# Patient Record
Sex: FEMALE | Race: WHITE | Employment: FULL TIME | URBAN - METROPOLITAN AREA
[De-identification: names, ages, dates, MRNs, and addresses within clinical notes are randomized per-mention and may not be internally consistent; named-entity substitution may affect disease eponyms.]

---

## 2018-06-28 ENCOUNTER — HOSPITAL ENCOUNTER (OUTPATIENT)
Dept: PREADMISSION TESTING | Age: 58
Discharge: HOME | End: 2018-06-28
Payer: COMMERCIAL

## 2018-06-28 LAB
ANION GAP SERPL CALC-SCNC: 7 MMOL/L (ref 3–18)
ATRIAL RATE: 71 BPM
BUN SERPL-MCNC: 13 MG/DL (ref 7–18)
BUN/CREAT SERPL: 17 (ref 12–20)
CALCIUM SERPL-MCNC: 9.2 MG/DL (ref 8.5–10.1)
CALCULATED P AXIS, ECG09: 71 DEGREES
CALCULATED R AXIS, ECG10: 64 DEGREES
CALCULATED T AXIS, ECG11: 55 DEGREES
CHLORIDE SERPL-SCNC: 106 MMOL/L (ref 100–108)
CO2 SERPL-SCNC: 29 MMOL/L (ref 21–32)
CREAT SERPL-MCNC: 0.77 MG/DL (ref 0.6–1.3)
DIAGNOSIS, 93000: NORMAL
EST. AVERAGE GLUCOSE BLD GHB EST-MCNC: 126 MG/DL
GLUCOSE SERPL-MCNC: 112 MG/DL (ref 74–99)
HBA1C MFR BLD: 6 % (ref 4.5–5.6)
HCT VFR BLD AUTO: 43.8 % (ref 35–45)
HGB BLD-MCNC: 14.2 G/DL (ref 12–16)
P-R INTERVAL, ECG05: 158 MS
POTASSIUM SERPL-SCNC: 4.4 MMOL/L (ref 3.5–5.5)
Q-T INTERVAL, ECG07: 412 MS
QRS DURATION, ECG06: 70 MS
QTC CALCULATION (BEZET), ECG08: 447 MS
SODIUM SERPL-SCNC: 142 MMOL/L (ref 136–145)
VENTRICULAR RATE, ECG03: 71 BPM

## 2018-06-28 PROCEDURE — 80048 BASIC METABOLIC PNL TOTAL CA: CPT | Performed by: OBSTETRICS & GYNECOLOGY

## 2018-06-28 PROCEDURE — 85018 HEMOGLOBIN: CPT | Performed by: OBSTETRICS & GYNECOLOGY

## 2018-06-28 PROCEDURE — 36415 COLL VENOUS BLD VENIPUNCTURE: CPT | Performed by: OBSTETRICS & GYNECOLOGY

## 2018-06-28 PROCEDURE — 93005 ELECTROCARDIOGRAM TRACING: CPT

## 2018-06-28 PROCEDURE — 83036 HEMOGLOBIN GLYCOSYLATED A1C: CPT | Performed by: OBSTETRICS & GYNECOLOGY

## 2018-07-13 ENCOUNTER — ANESTHESIA (OUTPATIENT)
Dept: SURGERY | Age: 58
DRG: 747 | End: 2018-07-13
Payer: COMMERCIAL

## 2018-07-13 ENCOUNTER — HOSPITAL ENCOUNTER (INPATIENT)
Age: 58
LOS: 1 days | Discharge: HOME | DRG: 747 | End: 2018-07-14
Attending: OBSTETRICS & GYNECOLOGY | Admitting: OBSTETRICS & GYNECOLOGY
Payer: COMMERCIAL

## 2018-07-13 ENCOUNTER — ANESTHESIA EVENT (OUTPATIENT)
Dept: SURGERY | Age: 58
DRG: 747 | End: 2018-07-13
Payer: COMMERCIAL

## 2018-07-13 LAB
GLUCOSE BLD STRIP.AUTO-MCNC: 102 MG/DL (ref 70–110)
GLUCOSE BLD STRIP.AUTO-MCNC: 109 MG/DL (ref 70–110)
GLUCOSE BLD STRIP.AUTO-MCNC: 118 MG/DL (ref 70–110)
GLUCOSE BLD STRIP.AUTO-MCNC: 139 MG/DL (ref 70–110)

## 2018-07-13 PROCEDURE — 77030020782 HC GWN BAIR PAWS FLX 3M -B: Performed by: OBSTETRICS & GYNECOLOGY

## 2018-07-13 PROCEDURE — 77030018836 HC SOL IRR NACL ICUM -A: Performed by: OBSTETRICS & GYNECOLOGY

## 2018-07-13 PROCEDURE — 74011250637 HC RX REV CODE- 250/637: Performed by: OBSTETRICS & GYNECOLOGY

## 2018-07-13 PROCEDURE — 77030020269 HC MISC IMPL: Performed by: OBSTETRICS & GYNECOLOGY

## 2018-07-13 PROCEDURE — 77030031140 HC SUT VCRL3 J&J -A: Performed by: OBSTETRICS & GYNECOLOGY

## 2018-07-13 PROCEDURE — 76010000131 HC OR TIME 2 TO 2.5 HR: Performed by: OBSTETRICS & GYNECOLOGY

## 2018-07-13 PROCEDURE — 77030018832 HC SOL IRR H20 ICUM -A: Performed by: OBSTETRICS & GYNECOLOGY

## 2018-07-13 PROCEDURE — 77030012508 HC MSK AIRWY LMA AMBU -A: Performed by: ANESTHESIOLOGY

## 2018-07-13 PROCEDURE — 74011000250 HC RX REV CODE- 250

## 2018-07-13 PROCEDURE — 82962 GLUCOSE BLOOD TEST: CPT

## 2018-07-13 PROCEDURE — 88307 TISSUE EXAM BY PATHOLOGIST: CPT | Performed by: OBSTETRICS & GYNECOLOGY

## 2018-07-13 PROCEDURE — 76210000006 HC OR PH I REC 0.5 TO 1 HR: Performed by: OBSTETRICS & GYNECOLOGY

## 2018-07-13 PROCEDURE — 77030032490 HC SLV COMPR SCD KNE COVD -B: Performed by: OBSTETRICS & GYNECOLOGY

## 2018-07-13 PROCEDURE — 65270000029 HC RM PRIVATE

## 2018-07-13 PROCEDURE — 0TJB8ZZ INSPECTION OF BLADDER, VIA NATURAL OR ARTIFICIAL OPENING ENDOSCOPIC: ICD-10-PCS | Performed by: OBSTETRICS & GYNECOLOGY

## 2018-07-13 PROCEDURE — 74011250636 HC RX REV CODE- 250/636

## 2018-07-13 PROCEDURE — 0JQC0ZZ REPAIR PELVIC REGION SUBCUTANEOUS TISSUE AND FASCIA, OPEN APPROACH: ICD-10-PCS | Performed by: OBSTETRICS & GYNECOLOGY

## 2018-07-13 PROCEDURE — 0USG0ZZ REPOSITION VAGINA, OPEN APPROACH: ICD-10-PCS | Performed by: OBSTETRICS & GYNECOLOGY

## 2018-07-13 PROCEDURE — 74011250636 HC RX REV CODE- 250/636: Performed by: ANESTHESIOLOGY

## 2018-07-13 PROCEDURE — 77030003029 HC SUT VCRL J&J -B: Performed by: OBSTETRICS & GYNECOLOGY

## 2018-07-13 PROCEDURE — 88305 TISSUE EXAM BY PATHOLOGIST: CPT | Performed by: OBSTETRICS & GYNECOLOGY

## 2018-07-13 PROCEDURE — 77030008064 HC RNG RETRCTR COOP -B: Performed by: OBSTETRICS & GYNECOLOGY

## 2018-07-13 PROCEDURE — 77030010011 HC RNG RETRCTR STAY COOP -B: Performed by: OBSTETRICS & GYNECOLOGY

## 2018-07-13 PROCEDURE — 77030019927 HC TBNG IRR CYSTO BAXT -A: Performed by: OBSTETRICS & GYNECOLOGY

## 2018-07-13 PROCEDURE — 77030005521 HC CATH URETH FOL38 BARD -B: Performed by: OBSTETRICS & GYNECOLOGY

## 2018-07-13 PROCEDURE — 74011000250 HC RX REV CODE- 250: Performed by: OBSTETRICS & GYNECOLOGY

## 2018-07-13 PROCEDURE — 74011250636 HC RX REV CODE- 250/636: Performed by: OBSTETRICS & GYNECOLOGY

## 2018-07-13 PROCEDURE — 77030026102 HC DEV TISS ENSEAL G2 J&J -F: Performed by: OBSTETRICS & GYNECOLOGY

## 2018-07-13 PROCEDURE — 77030031139 HC SUT VCRL2 J&J -A: Performed by: OBSTETRICS & GYNECOLOGY

## 2018-07-13 PROCEDURE — 76060000035 HC ANESTHESIA 2 TO 2.5 HR: Performed by: OBSTETRICS & GYNECOLOGY

## 2018-07-13 PROCEDURE — 0UTC0ZZ RESECTION OF CERVIX, OPEN APPROACH: ICD-10-PCS | Performed by: OBSTETRICS & GYNECOLOGY

## 2018-07-13 PROCEDURE — 77030011640 HC PAD GRND REM COVD -A: Performed by: OBSTETRICS & GYNECOLOGY

## 2018-07-13 RX ORDER — SODIUM CHLORIDE 0.9 % (FLUSH) 0.9 %
5-10 SYRINGE (ML) INJECTION AS NEEDED
Status: DISCONTINUED | OUTPATIENT
Start: 2018-07-13 | End: 2018-07-14 | Stop reason: HOSPADM

## 2018-07-13 RX ORDER — KETAMINE HYDROCHLORIDE 10 MG/ML
INJECTION, SOLUTION INTRAMUSCULAR; INTRAVENOUS AS NEEDED
Status: DISCONTINUED | OUTPATIENT
Start: 2018-07-13 | End: 2018-07-13 | Stop reason: HOSPADM

## 2018-07-13 RX ORDER — OXYCODONE AND ACETAMINOPHEN 5; 325 MG/1; MG/1
1 TABLET ORAL
Status: DISCONTINUED | OUTPATIENT
Start: 2018-07-13 | End: 2018-07-14 | Stop reason: HOSPADM

## 2018-07-13 RX ORDER — INSULIN LISPRO 100 [IU]/ML
INJECTION, SOLUTION INTRAVENOUS; SUBCUTANEOUS ONCE
Status: DISCONTINUED | OUTPATIENT
Start: 2018-07-13 | End: 2018-07-13 | Stop reason: HOSPADM

## 2018-07-13 RX ORDER — ONDANSETRON 2 MG/ML
4 INJECTION INTRAMUSCULAR; INTRAVENOUS
Status: DISCONTINUED | OUTPATIENT
Start: 2018-07-13 | End: 2018-07-14 | Stop reason: HOSPADM

## 2018-07-13 RX ORDER — ALBUTEROL SULFATE 0.83 MG/ML
2.5 SOLUTION RESPIRATORY (INHALATION) AS NEEDED
Status: DISCONTINUED | OUTPATIENT
Start: 2018-07-13 | End: 2018-07-13 | Stop reason: HOSPADM

## 2018-07-13 RX ORDER — BUPIVACAINE HYDROCHLORIDE AND EPINEPHRINE 2.5; 5 MG/ML; UG/ML
INJECTION, SOLUTION EPIDURAL; INFILTRATION; INTRACAUDAL; PERINEURAL AS NEEDED
Status: DISCONTINUED | OUTPATIENT
Start: 2018-07-13 | End: 2018-07-13 | Stop reason: HOSPADM

## 2018-07-13 RX ORDER — DEXAMETHASONE SODIUM PHOSPHATE 4 MG/ML
INJECTION, SOLUTION INTRA-ARTICULAR; INTRALESIONAL; INTRAMUSCULAR; INTRAVENOUS; SOFT TISSUE AS NEEDED
Status: DISCONTINUED | OUTPATIENT
Start: 2018-07-13 | End: 2018-07-13 | Stop reason: HOSPADM

## 2018-07-13 RX ORDER — DIPHENHYDRAMINE HYDROCHLORIDE 50 MG/ML
12.5 INJECTION, SOLUTION INTRAMUSCULAR; INTRAVENOUS
Status: DISCONTINUED | OUTPATIENT
Start: 2018-07-13 | End: 2018-07-13 | Stop reason: HOSPADM

## 2018-07-13 RX ORDER — MAGNESIUM SULFATE 100 %
4 CRYSTALS MISCELLANEOUS AS NEEDED
Status: DISCONTINUED | OUTPATIENT
Start: 2018-07-13 | End: 2018-07-13 | Stop reason: HOSPADM

## 2018-07-13 RX ORDER — SODIUM CHLORIDE 0.9 % (FLUSH) 0.9 %
5-10 SYRINGE (ML) INJECTION AS NEEDED
Status: DISCONTINUED | OUTPATIENT
Start: 2018-07-13 | End: 2018-07-13 | Stop reason: HOSPADM

## 2018-07-13 RX ORDER — FENTANYL CITRATE 50 UG/ML
25 INJECTION, SOLUTION INTRAMUSCULAR; INTRAVENOUS AS NEEDED
Status: DISCONTINUED | OUTPATIENT
Start: 2018-07-13 | End: 2018-07-13 | Stop reason: HOSPADM

## 2018-07-13 RX ORDER — SODIUM CHLORIDE, SODIUM LACTATE, POTASSIUM CHLORIDE, CALCIUM CHLORIDE 600; 310; 30; 20 MG/100ML; MG/100ML; MG/100ML; MG/100ML
125 INJECTION, SOLUTION INTRAVENOUS CONTINUOUS
Status: DISCONTINUED | OUTPATIENT
Start: 2018-07-13 | End: 2018-07-14 | Stop reason: HOSPADM

## 2018-07-13 RX ORDER — ACETAMINOPHEN 10 MG/ML
INJECTION, SOLUTION INTRAVENOUS AS NEEDED
Status: DISCONTINUED | OUTPATIENT
Start: 2018-07-13 | End: 2018-07-13 | Stop reason: HOSPADM

## 2018-07-13 RX ORDER — EPHEDRINE SULFATE/0.9% NACL/PF 25 MG/5 ML
SYRINGE (ML) INTRAVENOUS AS NEEDED
Status: DISCONTINUED | OUTPATIENT
Start: 2018-07-13 | End: 2018-07-13 | Stop reason: HOSPADM

## 2018-07-13 RX ORDER — OXYCODONE AND ACETAMINOPHEN 5; 325 MG/1; MG/1
2 TABLET ORAL
Status: DISCONTINUED | OUTPATIENT
Start: 2018-07-13 | End: 2018-07-14 | Stop reason: HOSPADM

## 2018-07-13 RX ORDER — CEFAZOLIN SODIUM/WATER 2 G/20 ML
2 SYRINGE (ML) INTRAVENOUS
Status: COMPLETED | OUTPATIENT
Start: 2018-07-13 | End: 2018-07-13

## 2018-07-13 RX ORDER — DEXTROSE 50 % IN WATER (D50W) INTRAVENOUS SYRINGE
25-50 AS NEEDED
Status: DISCONTINUED | OUTPATIENT
Start: 2018-07-13 | End: 2018-07-13 | Stop reason: HOSPADM

## 2018-07-13 RX ORDER — DIPHENHYDRAMINE HCL 25 MG
25 CAPSULE ORAL
Status: DISCONTINUED | OUTPATIENT
Start: 2018-07-13 | End: 2018-07-14 | Stop reason: HOSPADM

## 2018-07-13 RX ORDER — HYDROMORPHONE HYDROCHLORIDE 2 MG/ML
INJECTION, SOLUTION INTRAMUSCULAR; INTRAVENOUS; SUBCUTANEOUS AS NEEDED
Status: DISCONTINUED | OUTPATIENT
Start: 2018-07-13 | End: 2018-07-13 | Stop reason: HOSPADM

## 2018-07-13 RX ORDER — NALOXONE HYDROCHLORIDE 0.4 MG/ML
0.1 INJECTION, SOLUTION INTRAMUSCULAR; INTRAVENOUS; SUBCUTANEOUS AS NEEDED
Status: DISCONTINUED | OUTPATIENT
Start: 2018-07-13 | End: 2018-07-13 | Stop reason: HOSPADM

## 2018-07-13 RX ORDER — ONDANSETRON 2 MG/ML
4 INJECTION INTRAMUSCULAR; INTRAVENOUS ONCE
Status: DISCONTINUED | OUTPATIENT
Start: 2018-07-13 | End: 2018-07-13 | Stop reason: HOSPADM

## 2018-07-13 RX ORDER — ONDANSETRON 2 MG/ML
INJECTION INTRAMUSCULAR; INTRAVENOUS AS NEEDED
Status: DISCONTINUED | OUTPATIENT
Start: 2018-07-13 | End: 2018-07-13 | Stop reason: HOSPADM

## 2018-07-13 RX ORDER — SODIUM CHLORIDE, SODIUM LACTATE, POTASSIUM CHLORIDE, CALCIUM CHLORIDE 600; 310; 30; 20 MG/100ML; MG/100ML; MG/100ML; MG/100ML
150 INJECTION, SOLUTION INTRAVENOUS CONTINUOUS
Status: DISCONTINUED | OUTPATIENT
Start: 2018-07-13 | End: 2018-07-13 | Stop reason: HOSPADM

## 2018-07-13 RX ORDER — KETOROLAC TROMETHAMINE 30 MG/ML
30 INJECTION, SOLUTION INTRAMUSCULAR; INTRAVENOUS
Status: DISCONTINUED | OUTPATIENT
Start: 2018-07-13 | End: 2018-07-14 | Stop reason: HOSPADM

## 2018-07-13 RX ORDER — SODIUM CHLORIDE 0.9 % (FLUSH) 0.9 %
5-10 SYRINGE (ML) INJECTION EVERY 8 HOURS
Status: DISCONTINUED | OUTPATIENT
Start: 2018-07-13 | End: 2018-07-14 | Stop reason: HOSPADM

## 2018-07-13 RX ORDER — FENTANYL CITRATE 50 UG/ML
INJECTION, SOLUTION INTRAMUSCULAR; INTRAVENOUS AS NEEDED
Status: DISCONTINUED | OUTPATIENT
Start: 2018-07-13 | End: 2018-07-13 | Stop reason: HOSPADM

## 2018-07-13 RX ORDER — MIDAZOLAM HYDROCHLORIDE 1 MG/ML
INJECTION, SOLUTION INTRAMUSCULAR; INTRAVENOUS AS NEEDED
Status: DISCONTINUED | OUTPATIENT
Start: 2018-07-13 | End: 2018-07-13 | Stop reason: HOSPADM

## 2018-07-13 RX ADMIN — KETOROLAC TROMETHAMINE 30 MG: 30 INJECTION, SOLUTION INTRAMUSCULAR at 19:57

## 2018-07-13 RX ADMIN — Medication 2 G: at 11:55

## 2018-07-13 RX ADMIN — SODIUM CHLORIDE, SODIUM LACTATE, POTASSIUM CHLORIDE, AND CALCIUM CHLORIDE 125 ML/HR: 600; 310; 30; 20 INJECTION, SOLUTION INTRAVENOUS at 19:52

## 2018-07-13 RX ADMIN — HYDROMORPHONE HYDROCHLORIDE 0.5 MG: 2 INJECTION, SOLUTION INTRAMUSCULAR; INTRAVENOUS; SUBCUTANEOUS at 13:35

## 2018-07-13 RX ADMIN — SODIUM CHLORIDE, SODIUM LACTATE, POTASSIUM CHLORIDE, AND CALCIUM CHLORIDE 150 ML/HR: 600; 310; 30; 20 INJECTION, SOLUTION INTRAVENOUS at 15:25

## 2018-07-13 RX ADMIN — FENTANYL CITRATE 50 MCG: 50 INJECTION, SOLUTION INTRAMUSCULAR; INTRAVENOUS at 11:52

## 2018-07-13 RX ADMIN — DEXAMETHASONE SODIUM PHOSPHATE 4 MG: 4 INJECTION, SOLUTION INTRA-ARTICULAR; INTRALESIONAL; INTRAMUSCULAR; INTRAVENOUS; SOFT TISSUE at 11:52

## 2018-07-13 RX ADMIN — ONDANSETRON 4 MG: 2 INJECTION INTRAMUSCULAR; INTRAVENOUS at 13:36

## 2018-07-13 RX ADMIN — SODIUM CHLORIDE, SODIUM LACTATE, POTASSIUM CHLORIDE, AND CALCIUM CHLORIDE: 600; 310; 30; 20 INJECTION, SOLUTION INTRAVENOUS at 11:48

## 2018-07-13 RX ADMIN — MIDAZOLAM HYDROCHLORIDE 2 MG: 1 INJECTION, SOLUTION INTRAMUSCULAR; INTRAVENOUS at 11:47

## 2018-07-13 RX ADMIN — KETAMINE HYDROCHLORIDE 50 MG: 10 INJECTION, SOLUTION INTRAMUSCULAR; INTRAVENOUS at 11:57

## 2018-07-13 RX ADMIN — Medication 5 MG: at 12:04

## 2018-07-13 RX ADMIN — SODIUM CHLORIDE, SODIUM LACTATE, POTASSIUM CHLORIDE, AND CALCIUM CHLORIDE: 600; 310; 30; 20 INJECTION, SOLUTION INTRAVENOUS at 13:30

## 2018-07-13 RX ADMIN — FENTANYL CITRATE 25 MCG: 50 INJECTION, SOLUTION INTRAMUSCULAR; INTRAVENOUS at 12:25

## 2018-07-13 RX ADMIN — Medication 5 MG: at 12:10

## 2018-07-13 RX ADMIN — SODIUM CHLORIDE, SODIUM LACTATE, POTASSIUM CHLORIDE, AND CALCIUM CHLORIDE 125 ML/HR: 600; 310; 30; 20 INJECTION, SOLUTION INTRAVENOUS at 10:17

## 2018-07-13 RX ADMIN — FENTANYL CITRATE 25 MCG: 50 INJECTION, SOLUTION INTRAMUSCULAR; INTRAVENOUS at 12:36

## 2018-07-13 RX ADMIN — HYDROMORPHONE HYDROCHLORIDE 0.5 MG: 2 INJECTION, SOLUTION INTRAMUSCULAR; INTRAVENOUS; SUBCUTANEOUS at 13:53

## 2018-07-13 RX ADMIN — ACETAMINOPHEN 1000 MG: 10 INJECTION, SOLUTION INTRAVENOUS at 12:17

## 2018-07-13 RX ADMIN — OXYCODONE HYDROCHLORIDE AND ACETAMINOPHEN 2 TABLET: 5; 325 TABLET ORAL at 19:57

## 2018-07-13 NOTE — PERIOP NOTES
Patient received from OR Team. Dual identification and skin assessment completed. Review of Procedure and Intra Operative Course conducted.

## 2018-07-13 NOTE — BRIEF OP NOTE
BRIEF OPERATIVE NOTE    Date of Procedure: 7/13/2018     Preoperative Diagnosis: CERVICAL STUMP PROLAPSE,VAGINAL ENTEROCELE,CYSTOCELE    Postoperative Diagnosis: CERVICAL STUMP PROLAPSE,VAGINAL ENTEROCELE,CYSTOCELE, pelvic mass      Procedure(s):  TRACHELECTOMY,ANTERIOR AND POSTERIOR REPAIR,SACROSPINOUS LIGAMENT SUSPENSION,CYSTOSCOPY **SPEC POP**, Examination under anesthesia    Surgeon(s) and Role:     * Benjamin Barbosa MD - Primary         Surgical Assistant: Hazel Field CST    Surgical Staff:  Circ-1: Fady Dumont  Circ-Relief: Gio Dougherty RN; Camilo Ruiz, WOLF  Surg Asst-1: Delisa Reynolds    Event Time In   Incision Start 1211   Incision Close 1347     Anesthesia: General     Estimated Blood Loss: 100 ml    Specimens:   ID Type Source Tests Collected by Time Destination   1 : CERVIX Preservative Cervix  Benjamin Barbosa MD 7/13/2018 1258 Pathology      Findings: Examination under anesthesia revealed a well-circumscribed, smooth pelvic mass, 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 Pt had known pelvic relaxation with prolapse of the cervix to the introitus, anterior defect and posterior compartment defect. The uterus was surgically absent (supracervical hysterectomy)     Complications: none    Implants:   Implant Name Type Inv.  Item Serial No.  Lot No. LRB No. Used Action   [facility]       [employer] [id number] N/A 1 Implanted     Disposition:  Stable to recovery    Benjamin Barbosa MD 2:15 PM 7/13/2018

## 2018-07-13 NOTE — IP AVS SNAPSHOT
[address] [address] 
[phone] Patient: Seng Davis MRN: [id number] UTH:5[birth year] A check huong indicates which time of day the medication should be taken. My Medications START taking these medications Instructions Each Dose to Equal  
 Morning Noon Evening Bedtime  
 oxyCODONE-acetaminophen 5-325 mg per tablet Commonly known as:  PERCOCET Your last dose was: Your next dose is: Take 1 Tab by mouth every four (4) hours as needed. Max Daily Amount: 6 Tabs. 1 Tab CONTINUE taking these medications Instructions Each Dose to Equal  
 Morning Noon Evening Bedtime  
 atorvastatin 20 mg tablet Commonly known as:  LIPITOR Your last dose was: Your next dose is: TAKE 1 TABLET BY MOUTH bedtime  
     
   
   
   
  
 clobetasol 0.05 % ointment Commonly known as:  Linzie Rubins Your last dose was: Your next dose is:    
   
   
 Apply  to affected area. diclofenac EC 75 mg EC tablet Commonly known as:  VOLTAREN Your last dose was: Your next dose is: Take  by mouth two (2) times daily as needed. ENBREL 50 mg/mL (0.98 mL) injection Generic drug:  etanercept Your last dose was: Your next dose is:    
   
   
 by SubCUTAneous route Every Thursday. hydrOXYzine HCl 10 mg tablet Commonly known as:  ATARAX Your last dose was: Your next dose is: Take 10 mg by mouth. Indications: psoriasis 10 mg  
    
   
   
   
  
 metFORMIN 500 mg tablet Commonly known as:  GLUCOPHAGE Your last dose was: Your next dose is: Take 500 mg by mouth nightly. Indications: type 2 diabetes mellitus 500 mg PROBIOTIC 4X PO Your last dose was: Your next dose is: Take 1 Tab by mouth. 1 Tab  
    
   
   
   
  
 traZODone 150 mg tablet Commonly known as:  Padilla Gordon Your last dose was: Your next dose is:    
   
   
 1 po qhs  
     
   
   
   
  
 urea 40 % topical cream  
Commonly known as:  CARMOL Your last dose was: Your next dose is:    
   
   
 NAY MARBLE SIZED AMOUNT TO THICKENED AREAS ON BODY ONCE TO BID PRN  
     
   
   
   
  
 XANAX 2 mg tablet Generic drug:  ALPRAZolam  
   
Your last dose was: Your next dose is:    
   
   
 1 tablet by mouth every 8 hours as needed for severe anxiety Where to Get Your Medications Information on where to get these meds will be given to you by the nurse or doctor. ! Ask your nurse or doctor about these medications  
  oxyCODONE-acetaminophen 5-325 mg per tablet

## 2018-07-13 NOTE — ANESTHESIA POSTPROCEDURE EVALUATION
Post-Anesthesia Evaluation and Assessment    Cardiovascular Function/Vital Signs  Visit Vitals    /68 (BP 1 Location: Right arm, BP Patient Position: At rest)    Pulse 65    Temp 36.6 °C (97.8 °F)    Resp 14    Ht 5' 7.5\" (1.715 m)    Wt 79.9 kg (176 lb 1 oz)    SpO2 100%    BMI 27.17 kg/m2       Patient is status post Procedure(s):  TRACHELECTOMY,ANTERIOR AND POSTERIOR REPAIR,SACROSPINOUS LIGAMENT SUSPENSION,CYSTOSCOPY **SPEC POP**. Nausea/Vomiting: Controlled. Postoperative hydration reviewed and adequate. Pain:  Pain Scale 1: Numeric (0 - 10) (07/13/18 1450)  Pain Intensity 1: 0 (07/13/18 1450)   Managed. Neurological Status:   Neuro (WDL): Exceptions to WDL (07/13/18 1359)   At baseline. Mental Status and Level of Consciousness: Arousable. Pulmonary Status:   O2 Device: Nasal cannula (07/13/18 1450)   Adequate oxygenation and airway patent. Complications related to anesthesia: None    Post-anesthesia assessment completed. No concerns. Patient has met all discharge requirements.     Signed By: Antoni Massey CRNA    July 13, 2018

## 2018-07-13 NOTE — IP AVS SNAPSHOT
[address] [place] [id number] 
[phone] Patient: Rosario Anguiano MRN: [id number] VELMA:[id number] About your hospitalization You were admitted on:  July 13, 2018 You last received care in the:  [address] [place] [address] You were discharged on:  July 14, 2018 Why you were hospitalized Your primary diagnosis was:  Not on File Your diagnoses also included:  Pelvic Relaxation Due To Cervical Stump Prolpase Follow-up Information Follow up With Details Comments Contact Info Abraham Stephenson, MD   Patient can only remember the practice name and not the physician Discharge Orders None A check huong indicates which time of day the medication should be taken. My Medications START taking these medications Instructions Each Dose to Equal  
 Morning Noon Evening Bedtime  
 oxyCODONE-acetaminophen 5-325 mg per tablet Commonly known as:  PERCOCET Your last dose was: Your next dose is: Take 1 Tab by mouth every four (4) hours as needed. Max Daily Amount: 6 Tabs. 1 Tab CONTINUE taking these medications Instructions Each Dose to Equal  
 Morning Noon Evening Bedtime  
 atorvastatin 20 mg tablet Commonly known as:  LIPITOR Your last dose was: Your next dose is: TAKE 1 TABLET BY MOUTH bedtime  
     
   
   
   
  
 clobetasol 0.05 % ointment Commonly known as:  Harrie Reagin Your last dose was: Your next dose is:    
   
   
 Apply  to affected area. diclofenac EC 75 mg EC tablet Commonly known as:  VOLTAREN Your last dose was: Your next dose is: Take  by mouth two (2) times daily as needed. ENBREL 50 mg/mL (0.98 mL) injection Generic drug:  etanercept Your last dose was: Your next dose is: by SubCUTAneous route Every Thursday. hydrOXYzine HCl 10 mg tablet Commonly known as:  ATARAX Your last dose was: Your next dose is: Take 10 mg by mouth. Indications: psoriasis 10 mg  
    
   
   
   
  
 metFORMIN 500 mg tablet Commonly known as:  GLUCOPHAGE Your last dose was: Your next dose is: Take 500 mg by mouth nightly. Indications: type 2 diabetes mellitus 500 mg PROBIOTIC 4X PO Your last dose was: Your next dose is: Take 1 Tab by mouth. 1 Tab  
    
   
   
   
  
 traZODone 150 mg tablet Commonly known as:  Debbe Gunner Your last dose was: Your next dose is:    
   
   
 1 po qhs  
     
   
   
   
  
 urea 40 % topical cream  
Commonly known as:  CARMOL Your last dose was: Your next dose is:    
   
   
 NAY MARBLE SIZED AMOUNT TO THICKENED AREAS ON BODY ONCE TO BID PRN  
     
   
   
   
  
 XANAX 2 mg tablet Generic drug:  ALPRAZolam  
   
Your last dose was: Your next dose is:    
   
   
 1 tablet by mouth every 8 hours as needed for severe anxiety Where to Get Your Medications Information on where to get these meds will be given to you by the nurse or doctor. ! Ask your nurse or doctor about these medications  
  oxyCODONE-acetaminophen 5-325 mg per tablet Opioid Education Prescription Opioids: What You Need to Know: 
 
Prescription opioids can be used to help relieve moderate-to-severe pain and are often prescribed following a surgery or injury, or for certain health conditions. These medications can be an important part of treatment but also come with serious risks. Opioids are strong pain medicines. Examples include hydrocodone, oxycodone, fentanyl, and morphine. Heroin is an example of an illegal opioid.   It is important to work with your health care provider to make sure you are getting the safest, most effective care. WHAT ARE THE RISKS AND SIDE EFFECTS OF OPIOID USE? Prescription opioids carry serious risks of addiction and overdose, especially with prolonged use. An opioid overdose, often marked by slow breathing, can cause sudden death. The use of prescription opioids can have a number of side effects as well, even when taken as directed. · Tolerance-meaning you might need to take more of a medication for the same pain relief · Physical dependence-meaning you have symptoms of withdrawal when the medication is stopped. Withdrawal symptoms can include nausea, sweating, chills, diarrhea, stomach cramps, and muscle aches. Withdrawal can last up to several weeks, depending on which drug you took and how long you took it. · Increased sensitivity to pain · Constipation · Nausea, vomiting, and dry mouth · Sleepiness and dizziness · Confusion · Depression · Low levels of testosterone that can result in lower sex drive, energy, and strength · Itching and sweating RISKS ARE GREATER WITH:      
· History of drug misuse, substance use disorder, or overdose · Mental health conditions (such as depression or anxiety) · Sleep apnea · Older age (72 years or older) · Pregnancy Avoid alcohol while taking prescription opioids. Also, unless specifically advised by your health care provider, medications to avoid include: · Benzodiazepines (such as Xanax or Valium) · Muscle relaxants (such as Soma or Flexeril) · Hypnotics (such as Ambien or Lunesta) · Other prescription opioids KNOW YOUR OPTIONS Talk to your health care provider about ways to manage your pain that don't involve prescription opioids. Some of these options may actually work better and have fewer risks and side effects. Options may include: 
· Pain relievers such as acetaminophen, ibuprofen, and naproxen · Some medications that are also used for depression or seizures · Physical therapy and exercise · Counseling to help patients learn how to cope better with triggers of pain and stress. · Application of heat or cold compress · Massage therapy · Relaxation techniques Be Informed Make sure you know the name of your medication, how much and how often to take it, and its potential risks & side effects. IF YOU ARE PRESCRIBED OPIOIDS FOR PAIN: 
· Never take opioids in greater amounts or more often than prescribed. Remember the goal is not to be pain-free but to manage your pain at a tolerable level. · Follow up with your primary care provider to: · Work together to create a plan on how to manage your pain. · Talk about ways to help manage your pain that don't involve prescription opioids. · Talk about any and all concerns and side effects. · Help prevent misuse and abuse. · Never sell or share prescription opioids · Help prevent misuse and abuse. · Store prescription opioids in a secure place and out of reach of others (this may include visitors, children, friends, and family). · Safely dispose of unused/unwanted prescription opioids: Find your community drug take-back program or your pharmacy mail-back program, or flush them down the toilet, following guidance from the Food and Drug Administration (www.fda.gov/Drugs/ResourcesForYou). · Visit www.cdc.gov/drugoverdose to learn about the risks of opioid abuse and overdose. · If you believe you may be struggling with addiction, tell your health care provider and ask for guidance or call [address] [place], [place] [address] at 8-390-293-CPJK. Discharge Instructions Pelvic Prolapse: What to Expect at [facility] Your Recovery You can expect to feel better and stronger each day, although you may get tired quickly and need pain medicine for a week or two.  You may need about 4 to 6 weeks to fully recover from open surgery and 1 to 2 weeks to recover from laparoscopic surgery or vaginal surgery. It is important to avoid heavy lifting while you are recovering, so that your incision can heal. 
This care sheet gives you a general idea about how long it will take for you to recover. But each person recovers at a different pace. Follow the steps below to get better as quickly as possible. How can you care for yourself at home? Activity 
  · Rest when you feel tired. Getting enough sleep will help you recover.  
  · Try to walk each day. Start out by walking a little more than you did the day before. Bit by bit, increase the amount you walk. Walking boosts blood flow and helps prevent pneumonia and constipation.  
  · Avoid lifting anything that will make you strain-which includes lifting a child, a vacuum, or grocery bags-for about 1 week after laparoscopic surgery and 4 to 6 weeks after open surgery.  
  · Avoid strenuous activities, such as biking, jogging, weightlifting, and aerobic exercise, for 4 to 6 weeks after open surgery and 1 week after laparoscopic surgery.  
  · You may shower. Pat the incision dry when you are done. Do not take a bath for the first week after surgery or until your doctor tells you it is okay.  
  · You may have some light vaginal bleeding. Wear sanitary pads if needed. Do not douche or use tampons.  
  · Ask your doctor when you can drive again.  
  · You will probably need to take 2 to 4 weeks off work for open surgery and 1 week off for laparoscopic surgery or vaginal surgery. It depends on the type of work you do and how you feel.  
  · Your doctor will tell you when you can have sex again. Diet 
  · You can eat your normal diet. If your stomach is upset, try bland, low-fat foods like plain rice, broiled chicken, toast, and yogurt.  
  · Drink plenty of fluids (unless your doctor tells you not to).   · You may notice that your bowel movements are not regular right after your surgery. This is common. Try to avoid constipation and straining with bowel movements. You may want to take a fiber supplement every day. If you have not had a bowel movement after a couple of days, ask your doctor about taking a mild laxative. Medicines 
  · Your doctor will tell you if and when you can restart your medicines. He or she will also give you instructions about taking any new medicines.  
  · If you take blood thinners, such as warfarin (Coumadin), clopidogrel (Plavix), or aspirin, be sure to talk to your doctor. He or she will tell you if and when to start taking those medicines again. Make sure that you understand exactly what your doctor wants you to do.  
  · Be safe with medicines. Take pain medicines exactly as directed. ¨ If the doctor gave you a prescription medicine for pain, take it as prescribed. ¨ If you are not taking a prescription pain medicine, ask your doctor if you can take an over-the-counter medicine.  
  · If you think your pain medicine is making you sick to your stomach: 
¨ Take your medicine after meals (unless your doctor tells you not to). ¨ Ask your doctor for a different pain medicine.  
  · If your doctor prescribed antibiotics, take them as directed. Do not stop taking them just because you feel better. You need to take the full course of antibiotics. Incision care 
  · If you have strips of tape on the cut (incision) the doctor made, leave the tape on for a week or until it falls off.  
  · Wash the area daily with warm, soapy water and pat it dry.  
  · Keep the area clean and dry. You may cover it with a gauze bandage if it weeps or rubs against clothing. Change the bandage every day.   
Other instructions 
  · If you go home with a urinary catheter, follow your doctor's instructions on catheter care.  
  · Wear loose, comfortable clothing and avoid anything that puts pressure on your belly, such as a girdle, for a few weeks.  
  · Your doctor may recommend pelvic floor (Kegel) exercises, which tighten and strengthen pelvic muscles, once you have completely healed. To do Kegel exercises: 
¨ Squeeze the same muscles you would use to stop your urine. Your belly and thighs should not move. ¨ Hold the squeeze for 3 seconds, and then relax for 3 seconds. ¨ Start with 3 seconds. Then add 1 second each week until you are able to squeeze for 10 seconds. ¨ Repeat the exercise 10 to 15 times for each session. Do three or more sessions each day. Follow-up care is a key part of your treatment and safety. Be sure to make and go to all appointments, and call your doctor if you are having problems. It's also a good idea to know your test results and keep a list of the medicines you take. When should you call for help? Call 911 anytime you think you may need emergency care. For example, call if: 
  · You passed out (lost consciousness).  
  · You have chest pain, are short of breath, or cough up blood.  
 Call your doctor now or seek immediate medical care if: 
  · You have bright red vaginal bleeding that soaks one or more pads in an hour, or you have large clots.  
  · You are sick to your stomach or cannot drink fluids.  
  · You have pain that does not get better after you take pain medicine.  
  · You have loose stitches, or your incision comes open.  
  · Bright red blood has soaked through the bandage over your incision.  
  · You have vaginal discharge that has increased in amount or smells bad.  
  · You have signs of infection, such as: 
¨ Increased pain, swelling, warmth, or redness. ¨ Red streaks leading from the incision. ¨ Pus draining from the incision. ¨ A fever.  
  · You cannot pass stools or gas.  
  · You have signs of a blood clot in your leg (called deep vein thrombosis), such as: 
¨ Pain in your calf, back of knee, thigh, or groin. ¨ Redness and swelling in your leg.  Watch closely for any changes in your health, and be sure to contact your doctor if you have any problems. Where can you learn more? Go to http://sujit-colleen.info/. Enter D393 in the search box to learn more about \"Pelvic Prolapse: What to Expect at Home. \" Current as of: October 6, 2017 Content Version: 11.7 © 3151-5734 [employer]. Care instructions adapted under license by [employer] (which disclaims liability or warranty for this information). If you have questions about a medical condition or this instruction, always ask your healthcare professional. [address] any warranty or liability for your use of this information. Introducing [facility] & HEALTH SERVICES! [facility] introduces "[employer] patient portal. Now you can access parts of your medical record, email your doctor's office, and request medication refills online. 1. In your internet browser, go to [email or web address] 2. Click on the First Time User? Click Here link in the Sign In box. You will see the New Member Sign Up page. 3. Enter your "[employer] Access Code exactly as it appears below. You will not need to use this code after youve completed the sign-up process. If you do not sign up before the expiration date, you must request a new code. · "[employer] Access Code: [email or web address] Expires: 9/26/2018  9:09 AM 
 
4. Enter the last four digits of your Social Security Number (xxxx) and Date of Birth (mm/dd/yyyy) as indicated and click Submit. You will be taken to the next sign-up page. 5. Create a "[employer] ID. This will be your "[employer] login ID and cannot be changed, so think of one that is secure and easy to remember. 6. Create a "[employer] password. You can change your password at any time. 7. Enter your Password Reset Question and Answer. This can be used at a later time if you forget your password. 8. Enter your e-mail address. You will receive e-mail notification when new information is available in [address] 9. Click Sign Up. You can now view and download portions of your medical record. 10. Click the Download Summary menu link to download a portable copy of your medical information. If you have questions, please visit the Frequently Asked Questions section of the [employer] website. Remember, [employer] is NOT to be used for urgent needs. For medical emergencies, dial 911. Now available from your iPhone and Android! Introducing Carlin Radford As a [employer] patient, I wanted to make you aware of our electronic visit tool called Carlin Radford. [employer] 24/7 allows you to connect within minutes with a medical provider 24 hours a day, seven days a week via a mobile device or tablet or logging into a secure website from your computer. You can access Carlin Radford from anywhere in the [place]. A virtual visit might be right for you when you have a simple condition and feel like you just dont want to get out of bed, or cant get away from work for an appointment, when your regular [employer] provider is not available (evenings, weekends or holidays), or when youre out of town and need minor care. Electronic visits cost only $49 and if the [employer] 24/7 provider determines a prescription is needed to treat your condition, one can be electronically transmitted to a nearby pharmacy*. Please take a moment to enroll today if you have not already done so. The enrollment process is free and takes just a few minutes. To enroll, please download the [employer] 24/7 tariq to your tablet or phone, or visit [email or web address] to enroll on your computer.    
And, as an [address] [place] [address] patient with a [employer] account, the results of your visits will be scanned into your electronic medical record and your primary care provider will be able to view the scanned results. We urge you to continue to see your regular [facility] provider for your ongoing medical care. And while your primary care provider may not be the one available when you seek a reQwip virtual visit, the peace of mind you get from getting a real diagnosis real time can be priceless. For more information on reQwip, view our Frequently Asked Questions (FAQs) at [email or web address]. Sincerely, 
 
Patricia Saucedo MD 
Chief Medical Officer [address] *:  certain medications cannot be prescribed via reQwip Providers Seen During Your Hospitalization Provider Specialty Primary office phone Mariaelena Whaley MD Obstetrics & Gynecology [phone] Your Primary Care Physician (PCP) Primary Care Physician Office Phone Office Fax OTHER, PHYS ** None ** ** None ** You are allergic to the following Allergen Reactions Bee Venom Protein (Honey Bee) Anaphylaxis Lemon Hives  
 fresh Vicodin (Hydrocodone-Acetaminophen) Hives Itching Swelling Recent Documentation Height Weight BMI OB Status Smoking Status 1.715 m 84.6 kg 28.78 kg/m2 Hysterectomy Former Smoker Emergency Contacts Name Discharge Info Relation Home Work Mobile [address] [place] [address] CAREGIVER [3] Spouse [3] [phone] [facility] DISCHARGE CAREGIVER [3] Friend [5]   [phone] Patient Belongings The following personal items are in your possession at time of discharge: 
  Dental Appliances: Uppers, Sent home (isabella connors---spouse)  Visual Aid: Glasses          Jewelry: Necklace, Ring, Sent home (to [employer])  Clothing: Pants, Sent home, Shirt, Undergarments, Footwear, Socks (gina connors)    Other Valuables: [employer], Sent home (to [employer]) Please provide this summary of care documentation to your next provider. Signatures-by signing, you are acknowledging that this After Visit Summary has been reviewed with you and you have received a copy. Patient Signature:  ____________________________________________________________ Date:  ____________________________________________________________  
  
Zach Mais Provider Signature:  ____________________________________________________________ Date:  ____________________________________________________________

## 2018-07-13 NOTE — PERIOP NOTES
Reviewed PTA medication list with patient/caregiver and patient/caregiver denies any additional medications. Patient admits to having a responsible adult care for them for at least 24 hours after surgery.     Dual skin assessment completed by Daxa Bliss RN and Shaneka Schuler RN.

## 2018-07-13 NOTE — IP AVS SNAPSHOT
Summary of Care Report The Summary of Care report has been created to help improve care coordination. Users with access to [employer] or [address] (Web-based application) may access additional patient information including the Discharge Summary. If you are not currently a [address] user and need more information, please call the number listed below in the [place] [address] section and ask to be connected with Medical Records. Facility Information Name Address Phone [address] [address] [phone] Patient Information Patient Name Sex  Sena Awan ([id number]) Female 1960 Discharge Information Admitting Provider Service Area Unit Karlee Vogel MD / [address] Surg/Onco / [phone] Discharge Provider Discharge Date/Time Discharge Disposition Destination (none) 2018 (Pending) AHR (none) Patient Language Language ENGLISH [13] Hospital Problems as of 2018  Reviewed: 2018 11:39 AM by Karlee Vogel MD  
  
  
  
 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  
  
Non-Hospital Problems as of 2018  Reviewed: 2018 11:39 AM by Karlee Vogel MD  
 None You are allergic to the following Allergen Reactions Bee Venom Protein (Honey Bee) Anaphylaxis Lemon Hives  
 fresh Vicodin (Hydrocodone-Acetaminophen) Hives Itching Swelling Current Discharge Medication List  
  
START taking these medications Dose & Instructions Dispensing Information Comments  
 oxyCODONE-acetaminophen 5-325 mg per tablet Commonly known as:  PERCOCET Dose:  1 Tab Take 1 Tab by mouth every four (4) hours as needed. Max Daily Amount: 6 Tabs. Quantity:  20 Tab Refills:  0 CONTINUE these medications which have NOT CHANGED Dose & Instructions Dispensing Information Comments  
 atorvastatin 20 mg tablet Commonly known as:  LIPITOR  
 TAKE 1 TABLET BY MOUTH bedtime Refills:  0  
   
 clobetasol 0.05 % ointment Commonly known as:  Luetta Slimmer Apply  to affected area. Refills:  0  
   
 diclofenac EC 75 mg EC tablet Commonly known as:  VOLTAREN Take  by mouth two (2) times daily as needed. Refills:  0  
   
 ENBREL 50 mg/mL (0.98 mL) injection Generic drug:  etanercept  
 by SubCUTAneous route Every Thursday. Refills:  0  
   
 hydrOXYzine HCl 10 mg tablet Commonly known as:  ATARAX Dose:  10 mg Take 10 mg by mouth. Indications: psoriasis Refills:  0  
   
 metFORMIN 500 mg tablet Commonly known as:  GLUCOPHAGE Dose:  500 mg Take 500 mg by mouth nightly. Indications: type 2 diabetes mellitus Refills:  0 PROBIOTIC 4X PO Dose:  1 Tab Take 1 Tab by mouth. Refills:  0  
   
 traZODone 150 mg tablet Commonly known as:  Ranger Dessert 1 po qhs Refills:  0  
   
 urea 40 % topical cream  
Commonly known as:  CARMOL  
 NAY MARBLE SIZED AMOUNT TO THICKENED AREAS ON BODY ONCE TO BID PRN Refills:  0  
   
 XANAX 2 mg tablet Generic drug:  ALPRAZolam  
 1 tablet by mouth every 8 hours as needed for severe anxiety Refills:  0 Surgery Information ID Date/Time Status Primary Surgeon All Procedures Location [id number] 7/13/2018 [address] [place] [address], MD TRACHELECTOMY,ANTERIOR AND POSTERIOR REPAIR,SACROSPINOUS LIGAMENT SUSPENSION,CYSTOSCOPY [facility] Follow-up Information Follow up With Details Comments Contact Info Phys MD Seema   Patient can only remember the practice name and not the physician Discharge Instructions Pelvic Prolapse: What to Expect at [facility] Your Recovery You can expect to feel better and stronger each day, although you may get tired quickly and need pain medicine for a week or two. You may need about 4 to 6 weeks to fully recover from open surgery and 1 to 2 weeks to recover from laparoscopic surgery or vaginal surgery. It is important to avoid heavy lifting while you are recovering, so that your incision can heal. 
This care sheet gives you a general idea about how long it will take for you to recover. But each person recovers at a different pace. Follow the steps below to get better as quickly as possible. How can you care for yourself at home? Activity 
  · Rest when you feel tired. Getting enough sleep will help you recover.  
  · Try to walk each day. Start out by walking a little more than you did the day before. Bit by bit, increase the amount you walk. Walking boosts blood flow and helps prevent pneumonia and constipation.  
  · Avoid lifting anything that will make you strain-which includes lifting a child, a vacuum, or grocery bags-for about 1 week after laparoscopic surgery and 4 to 6 weeks after open surgery.  
  · Avoid strenuous activities, such as biking, jogging, weightlifting, and aerobic exercise, for 4 to 6 weeks after open surgery and 1 week after laparoscopic surgery.  
  · You may shower. Pat the incision dry when you are done. Do not take a bath for the first week after surgery or until your doctor tells you it is okay.  
  · You may have some light vaginal bleeding. Wear sanitary pads if needed. Do not douche or use tampons.  
  · Ask your doctor when you can drive again.  
  · You will probably need to take 2 to 4 weeks off work for open surgery and 1 week off for laparoscopic surgery or vaginal surgery. It depends on the type of work you do and how you feel.  
  · Your doctor will tell you when you can have sex again. Diet   · You can eat your normal diet. If your stomach is upset, try bland, low-fat foods like plain rice, broiled chicken, toast, and yogurt.  
  · Drink plenty of fluids (unless your doctor tells you not to).  
  · You may notice that your bowel movements are not regular right after your surgery. This is common. Try to avoid constipation and straining with bowel movements. You may want to take a fiber supplement every day. If you have not had a bowel movement after a couple of days, ask your doctor about taking a mild laxative. Medicines 
  · Your doctor will tell you if and when you can restart your medicines. He or she will also give you instructions about taking any new medicines.  
  · If you take blood thinners, such as warfarin (Coumadin), clopidogrel (Plavix), or aspirin, be sure to talk to your doctor. He or she will tell you if and when to start taking those medicines again. Make sure that you understand exactly what your doctor wants you to do.  
  · Be safe with medicines. Take pain medicines exactly as directed. ¨ If the doctor gave you a prescription medicine for pain, take it as prescribed. ¨ If you are not taking a prescription pain medicine, ask your doctor if you can take an over-the-counter medicine.  
  · If you think your pain medicine is making you sick to your stomach: 
¨ Take your medicine after meals (unless your doctor tells you not to). ¨ Ask your doctor for a different pain medicine.  
  · If your doctor prescribed antibiotics, take them as directed. Do not stop taking them just because you feel better. You need to take the full course of antibiotics. Incision care 
  · If you have strips of tape on the cut (incision) the doctor made, leave the tape on for a week or until it falls off.  
  · Wash the area daily with warm, soapy water and pat it dry.  
  · Keep the area clean and dry.  You may cover it with a gauze bandage if it weeps or rubs against clothing. Change the bandage every day. Other instructions 
  · If you go home with a urinary catheter, follow your doctor's instructions on catheter care.  
  · Wear loose, comfortable clothing and avoid anything that puts pressure on your belly, such as a girdle, for a few weeks.  
  · Your doctor may recommend pelvic floor (Kegel) exercises, which tighten and strengthen pelvic muscles, once you have completely healed. To do Kegel exercises: 
¨ Squeeze the same muscles you would use to stop your urine. Your belly and thighs should not move. ¨ Hold the squeeze for 3 seconds, and then relax for 3 seconds. ¨ Start with 3 seconds. Then add 1 second each week until you are able to squeeze for 10 seconds. ¨ Repeat the exercise 10 to 15 times for each session. Do three or more sessions each day. Follow-up care is a key part of your treatment and safety. Be sure to make and go to all appointments, and call your doctor if you are having problems. It's also a good idea to know your test results and keep a list of the medicines you take. When should you call for help? Call 911 anytime you think you may need emergency care. For example, call if: 
  · You passed out (lost consciousness).  
  · You have chest pain, are short of breath, or cough up blood.  
 Call your doctor now or seek immediate medical care if: 
  · You have bright red vaginal bleeding that soaks one or more pads in an hour, or you have large clots.  
  · You are sick to your stomach or cannot drink fluids.  
  · You have pain that does not get better after you take pain medicine.  
  · You have loose stitches, or your incision comes open.  
  · Bright red blood has soaked through the bandage over your incision.  
  · You have vaginal discharge that has increased in amount or smells bad.  
  · You have signs of infection, such as: 
¨ Increased pain, swelling, warmth, or redness. ¨ Red streaks leading from the incision. ¨ Pus draining from the incision. ¨ A fever.  
  · You cannot pass stools or gas.  
  · You have signs of a blood clot in your leg (called deep vein thrombosis), such as: 
¨ Pain in your calf, back of knee, thigh, or groin. ¨ Redness and swelling in your leg.  
 Watch closely for any changes in your health, and be sure to contact your doctor if you have any problems. Where can you learn more? Go to http://sujit-colleen.info/. Enter M165 in the search box to learn more about \"Pelvic Prolapse: What to Expect at Home. \" Current as of: October 6, 2017 Content Version: 11.7 © 4298-7985 [employer]. Care instructions adapted under license by [employer] (which disclaims liability or warranty for this information). If you have questions about a medical condition or this instruction, always ask your healthcare professional. [place] [address] any warranty or liability for your use of this information. Chart Review Routing History No Routing History on File

## 2018-07-13 NOTE — PROGRESS NOTES
1540-arived on unit  TRANSFER - IN REPORT:    Verbal report received from OUSMANE Craven RN(name) [facility]  being received from [employer]) for routine post - op      Report consisted of patients Situation, Background, Assessment and   Recommendations(SBAR). Information from the following report(s) SBAR, Kardex and MAR was reviewed with the receiving nurse. Opportunity for questions and clarification was provided. Assessment completed upon patients arrival to unit and care assumed. Primary Nurse Kate Martin and Jackye Alpers RN, perrformed a dual skin assessment on this patient No impairment noted, psoriasis plaques noted on bilateral elbows. Tobias score is 21. Bedside and Verbal shift change report given to Macy Valladares RN (oncoming nurse) by Ivy Salcido RN (offgoing nurse). Report included the following information SBAR, Kardex and MAR.

## 2018-07-13 NOTE — PERIOP NOTES
TRANSFER - OUT REPORT:    Verbal report given to Mer Smith RN (name) on Shahzad Gregg  being transferred to 3 S (unit) for routine post - op       Report consisted of patients Situation, Background, Assessment and   Recommendations(SBAR). Information from the following report(s) SBAR, Kardex, OR Summary, Intake/Output, MAR and Cardiac Rhythm NSR was reviewed with the receiving nurse. Lines:   Peripheral IV 07/13/18 Left Hand (Active)   Site Assessment Clean, dry, & intact 7/13/2018  3:00 PM   Phlebitis Assessment 0 7/13/2018  3:00 PM   Infiltration Assessment 0 7/13/2018  3:00 PM   Dressing Status Clean, dry, & intact 7/13/2018  3:00 PM   Dressing Type Tape;Transparent 7/13/2018  3:00 PM   Hub Color/Line Status Infusing;Pink 7/13/2018  3:00 PM        Opportunity for questions and clarification was provided.       Patient transported with:   O2 @ 2 liters

## 2018-07-13 NOTE — ANESTHESIA PREPROCEDURE EVALUATION
Anesthetic History   No history of anesthetic complications            Review of Systems / Medical History  Patient summary reviewed, nursing notes reviewed and pertinent labs reviewed    Pulmonary  Within defined limits                 Neuro/Psych   Within defined limits           Cardiovascular                  Exercise tolerance: >4 METS     GI/Hepatic/Renal  Within defined limits              Endo/Other    Diabetes    Arthritis     Other Findings              Physical Exam    Airway  Mallampati: II  TM Distance: 4 - 6 cm  Neck ROM: normal range of motion   Mouth opening: Normal     Cardiovascular  Regular rate and rhythm,  S1 and S2 normal,  no murmur, click, rub, or gallop             Dental    Dentition: Full upper dentures     Pulmonary  Breath sounds clear to auscultation               Abdominal  GI exam deferred       Other Findings            Anesthetic Plan    ASA: 2  Anesthesia type: general          Induction: Intravenous  Anesthetic plan and risks discussed with: Patient

## 2018-07-14 ENCOUNTER — APPOINTMENT (OUTPATIENT)
Dept: CT IMAGING | Age: 58
DRG: 747 | End: 2018-07-14
Attending: OBSTETRICS & GYNECOLOGY
Payer: COMMERCIAL

## 2018-07-14 VITALS
TEMPERATURE: 98.1 F | DIASTOLIC BLOOD PRESSURE: 61 MMHG | HEART RATE: 57 BPM | HEIGHT: 68 IN | WEIGHT: 186.51 LBS | OXYGEN SATURATION: 98 % | RESPIRATION RATE: 15 BRPM | BODY MASS INDEX: 28.27 KG/M2 | SYSTOLIC BLOOD PRESSURE: 122 MMHG

## 2018-07-14 LAB
BASOPHILS # BLD: 0 K/UL (ref 0–0.1)
BASOPHILS NFR BLD: 0 % (ref 0–2)
DIFFERENTIAL METHOD BLD: NORMAL
EOSINOPHIL # BLD: 0.1 K/UL (ref 0–0.4)
EOSINOPHIL NFR BLD: 1 % (ref 0–5)
ERYTHROCYTE [DISTWIDTH] IN BLOOD BY AUTOMATED COUNT: 13.8 % (ref 11.6–14.5)
HCT VFR BLD AUTO: 40.3 % (ref 35–45)
HGB BLD-MCNC: 12.8 G/DL (ref 12–16)
LYMPHOCYTES # BLD: 2.4 K/UL (ref 0.9–3.6)
LYMPHOCYTES NFR BLD: 23 % (ref 21–52)
MCH RBC QN AUTO: 28.3 PG (ref 24–34)
MCHC RBC AUTO-ENTMCNC: 31.8 G/DL (ref 31–37)
MCV RBC AUTO: 89.2 FL (ref 74–97)
MONOCYTES # BLD: 0.7 K/UL (ref 0.05–1.2)
MONOCYTES NFR BLD: 7 % (ref 3–10)
NEUTS SEG # BLD: 7.3 K/UL (ref 1.8–8)
NEUTS SEG NFR BLD: 69 % (ref 40–73)
PLATELET # BLD AUTO: 258 K/UL (ref 135–420)
PMV BLD AUTO: 9.8 FL (ref 9.2–11.8)
RBC # BLD AUTO: 4.52 M/UL (ref 4.2–5.3)
WBC # BLD AUTO: 10.5 K/UL (ref 4.6–13.2)

## 2018-07-14 PROCEDURE — 74177 CT ABD & PELVIS W/CONTRAST: CPT

## 2018-07-14 PROCEDURE — 85025 COMPLETE CBC W/AUTO DIFF WBC: CPT | Performed by: OBSTETRICS & GYNECOLOGY

## 2018-07-14 PROCEDURE — 36415 COLL VENOUS BLD VENIPUNCTURE: CPT | Performed by: OBSTETRICS & GYNECOLOGY

## 2018-07-14 PROCEDURE — 86301 IMMUNOASSAY TUMOR CA 19-9: CPT | Performed by: OBSTETRICS & GYNECOLOGY

## 2018-07-14 PROCEDURE — 74011636320 HC RX REV CODE- 636/320: Performed by: OBSTETRICS & GYNECOLOGY

## 2018-07-14 PROCEDURE — 74011250636 HC RX REV CODE- 250/636: Performed by: OBSTETRICS & GYNECOLOGY

## 2018-07-14 PROCEDURE — 82378 CARCINOEMBRYONIC ANTIGEN: CPT | Performed by: OBSTETRICS & GYNECOLOGY

## 2018-07-14 PROCEDURE — 86304 IMMUNOASSAY TUMOR CA 125: CPT | Performed by: OBSTETRICS & GYNECOLOGY

## 2018-07-14 PROCEDURE — 74011250637 HC RX REV CODE- 250/637: Performed by: OBSTETRICS & GYNECOLOGY

## 2018-07-14 RX ORDER — OXYCODONE AND ACETAMINOPHEN 5; 325 MG/1; MG/1
1 TABLET ORAL
Qty: 20 TAB | Refills: 0 | Status: SHIPPED | OUTPATIENT
Start: 2018-07-14 | End: 2018-09-06

## 2018-07-14 RX ADMIN — OXYCODONE HYDROCHLORIDE AND ACETAMINOPHEN 2 TABLET: 5; 325 TABLET ORAL at 05:47

## 2018-07-14 RX ADMIN — KETOROLAC TROMETHAMINE 30 MG: 30 INJECTION, SOLUTION INTRAMUSCULAR at 05:47

## 2018-07-14 RX ADMIN — DIATRIZOATE MEGLUMINE AND DIATRIZOATE SODIUM 30 ML: 660; 100 LIQUID ORAL; RECTAL at 08:34

## 2018-07-14 RX ADMIN — IOPAMIDOL 100 ML: 612 INJECTION, SOLUTION INTRAVENOUS at 11:34

## 2018-07-14 RX ADMIN — OXYCODONE HYDROCHLORIDE AND ACETAMINOPHEN 2 TABLET: 5; 325 TABLET ORAL at 00:40

## 2018-07-14 NOTE — PROGRESS NOTES
[facility] Progress Note      Assessment: POD# 1 s/p trachelectomy, anterior and posterior repair, sacrospinous ligament suspension, and cystoscopy. 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 Potential mets. Plan: Above findings discussed in detail with patient and family. Telephone discussion with Dr. Martha Arias who would like tumor markers ordered and he will see her in the office. Doing well from a postop standpoint, stable for discharge. Pelvic rest, regular diet, Norco and ibuprofen for pain. Followup with me in 2 weeks. Objective:    Visit Vitals    /61    Pulse (!) 57    Temp 98.1 °F (36.7 °C)    Resp 15    Ht 5' 7.5\" (1.715 m)    Wt 84.6 kg (186 lb 8.2 oz)    SpO2 98%    BMI 28.78 kg/m2          Intake and Output:  Current Shift:     Last three shifts:  07/12 1901 - 07/14 0700  In: 1400 [I.V.:1400]  Out: 2350 [Urine:2300]     Lungs: CTA bilat  CV:  RRR without m,r,g  Abd:  Soft, NT, ND, NABS. Large mass palpated to umbilicus, mobile, solid  Ext: no c/c/e, SCD's    Recent Results (from the past 12 hour(s))   CBC WITH AUTOMATED DIFF    Collection Time: 07/14/18  5:45 AM   Result Value Ref Range    WBC 10.5 4.6 - 13.2 K/uL    RBC 4.52 4.20 - 5.30 M/uL    HGB 12.8 12.0 - 16.0 g/dL    HCT 40.3 35.0 - 45.0 %    MCV 89.2 74.0 - 97.0 FL    MCH 28.3 24.0 - 34.0 PG    MCHC 31.8 31.0 - 37.0 g/dL    RDW 13.8 11.6 - 14.5 %    PLATELET 132 409 - 024 K/uL    MPV 9.8 9.2 - 11.8 FL    NEUTROPHILS 69 40 - 73 %    LYMPHOCYTES 23 21 - 52 %    MONOCYTES 7 3 - 10 %    EOSINOPHILS 1 0 - 5 %    BASOPHILS 0 0 - 2 %    ABS. NEUTROPHILS 7.3 1.8 - 8.0 K/UL    ABS. LYMPHOCYTES 2.4 0.9 - 3.6 K/UL    ABS. MONOCYTES 0.7 0.05 - 1.2 K/UL    ABS. EOSINOPHILS 0.1 0.0 - 0.4 K/UL    ABS. BASOPHILS 0.0 0.0 - 0.1 K/UL    DF AUTOMATED             Subjective: Pt without complaints. Felicity reg diet. No n/v/f/c. Voiding.      Kamala Stall Emely Valdes MD, MD  7/14/2018 1:28 PM

## 2018-07-14 NOTE — DISCHARGE INSTRUCTIONS
Pelvic Prolapse: What to Expect at [address] can expect to feel better and stronger each day, although you may get tired quickly and need pain medicine for a week or two. You may need about 4 to 6 weeks to fully recover from open surgery and 1 to 2 weeks to recover from laparoscopic surgery or vaginal surgery. It is important to avoid heavy lifting while you are recovering, so that your incision can heal.  This care sheet gives you a general idea about how long it will take for you to recover. But each person recovers at a different pace. Follow the steps below to get better as quickly as possible. How can you care for yourself at home? Activity    · Rest when you feel tired. Getting enough sleep will help you recover.     · Try to walk each day. Start out by walking a little more than you did the day before. Bit by bit, increase the amount you walk. Walking boosts blood flow and helps prevent pneumonia and constipation.     · Avoid lifting anything that will make you strain-which includes lifting a child, a vacuum, or grocery bags-for about 1 week after laparoscopic surgery and 4 to 6 weeks after open surgery.     · Avoid strenuous activities, such as biking, jogging, weightlifting, and aerobic exercise, for 4 to 6 weeks after open surgery and 1 week after laparoscopic surgery.     · You may shower. Pat the incision dry when you are done. Do not take a bath for the first week after surgery or until your doctor tells you it is okay.     · You may have some light vaginal bleeding. Wear sanitary pads if needed. Do not douche or use tampons.     · Ask your doctor when you can drive again.     · You will probably need to take 2 to 4 weeks off work for open surgery and 1 week off for laparoscopic surgery or vaginal surgery. It depends on the type of work you do and how you feel.     · Your doctor will tell you when you can have sex again. Diet    · You can eat your normal diet.  If your stomach is upset, try bland, low-fat foods like plain rice, broiled chicken, toast, and yogurt.     · Drink plenty of fluids (unless your doctor tells you not to).     · You may notice that your bowel movements are not regular right after your surgery. This is common. Try to avoid constipation and straining with bowel movements. You may want to take a fiber supplement every day. If you have not had a bowel movement after a couple of days, ask your doctor about taking a mild laxative. Medicines    · Your doctor will tell you if and when you can restart your medicines. He or she will also give you instructions about taking any new medicines.     · If you take blood thinners, such as warfarin (Coumadin), clopidogrel (Plavix), or aspirin, be sure to talk to your doctor. He or she will tell you if and when to start taking those medicines again. Make sure that you understand exactly what your doctor wants you to do.     · Be safe with medicines. Take pain medicines exactly as directed. ¨ If the doctor gave you a prescription medicine for pain, take it as prescribed. ¨ If you are not taking a prescription pain medicine, ask your doctor if you can take an over-the-counter medicine.     · If you think your pain medicine is making you sick to your stomach:  ¨ Take your medicine after meals (unless your doctor tells you not to). ¨ Ask your doctor for a different pain medicine.     · If your doctor prescribed antibiotics, take them as directed. Do not stop taking them just because you feel better. You need to take the full course of antibiotics. Incision care    · If you have strips of tape on the cut (incision) the doctor made, leave the tape on for a week or until it falls off.     · Wash the area daily with warm, soapy water and pat it dry.     · Keep the area clean and dry. You may cover it with a gauze bandage if it weeps or rubs against clothing. Change the bandage every day.    Other instructions    · If you go home with a urinary catheter, follow your doctor's instructions on catheter care.     · Wear loose, comfortable clothing and avoid anything that puts pressure on your belly, such as a girdle, for a few weeks.     · Your doctor may recommend pelvic floor (Kegel) exercises, which tighten and strengthen pelvic muscles, once you have completely healed. To do Kegel exercises:  ¨ Squeeze the same muscles you would use to stop your urine. Your belly and thighs should not move. ¨ Hold the squeeze for 3 seconds, and then relax for 3 seconds. ¨ Start with 3 seconds. Then add 1 second each week until you are able to squeeze for 10 seconds. ¨ Repeat the exercise 10 to 15 times for each session. Do three or more sessions each day. Follow-up care is a key part of your treatment and safety. Be sure to make and go to all appointments, and call your doctor if you are having problems. It's also a good idea to know your test results and keep a list of the medicines you take. When should you call for help? Call 911 anytime you think you may need emergency care. For example, call if:    · You passed out (lost consciousness).     · You have chest pain, are short of breath, or cough up blood.    Call your doctor now or seek immediate medical care if:    · You have bright red vaginal bleeding that soaks one or more pads in an hour, or you have large clots.     · You are sick to your stomach or cannot drink fluids.     · You have pain that does not get better after you take pain medicine.     · You have loose stitches, or your incision comes open.     · Bright red blood has soaked through the bandage over your incision.     · You have vaginal discharge that has increased in amount or smells bad.     · You have signs of infection, such as:  ¨ Increased pain, swelling, warmth, or redness. ¨ Red streaks leading from the incision. ¨ Pus draining from the incision.   ¨ A fever.     · You cannot pass stools or gas.     · You have signs of a blood clot in your leg (called deep vein thrombosis), such as:  ¨ Pain in your calf, back of knee, thigh, or groin. ¨ Redness and swelling in your leg.    Watch closely for any changes in your health, and be sure to contact your doctor if you have any problems. Where can you learn more? Go to http://sujit-colleen.info/. Enter W841 in the search box to learn more about \"Pelvic Prolapse: What to Expect at Home. \"  Current as of: October 6, 2017  Content Version: 11.7  © 0640-5758 [employer]. Care instructions adapted under license by [employer] (which disclaims liability or warranty for this information). If you have questions about a medical condition or this instruction, always ask your healthcare professional. [address] any warranty or liability for your use of this information.

## 2018-07-14 NOTE — PROGRESS NOTES
0720-received report from Donna Servin RN included SBAR MAR and Kardex. 1030-removed vaginal packing as per MD order. 1435-discharged to home, prescription given and instructions reviewed and understood by pt.

## 2018-07-14 NOTE — ROUTINE PROCESS
Bedside and Verbal shift change report given to Benny Posadas RN (oncoming nurse) by Jessica Liu RN (offgoing nurse). Report included the following information SBAR, Kardex, OR Summary, Procedure Summary, Intake/Output, MAR, Recent Results and Med Rec Status.

## 2018-07-16 LAB — CEA SERPL-MCNC: 1.5 NG/ML

## 2018-07-17 ENCOUNTER — OFFICE VISIT (OUTPATIENT)
Dept: ONCOLOGY | Age: 58
End: 2018-07-17

## 2018-07-17 VITALS
HEART RATE: 86 BPM | DIASTOLIC BLOOD PRESSURE: 70 MMHG | TEMPERATURE: 98.2 F | RESPIRATION RATE: 20 BRPM | HEIGHT: 67 IN | OXYGEN SATURATION: 97 % | SYSTOLIC BLOOD PRESSURE: 120 MMHG | WEIGHT: 174.8 LBS | BODY MASS INDEX: 27.44 KG/M2

## 2018-07-17 DIAGNOSIS — R19.00 PELVIC MASS IN FEMALE: ICD-10-CM

## 2018-07-17 DIAGNOSIS — R91.1 PULMONARY NODULE: Primary | ICD-10-CM

## 2018-07-17 NOTE — MR AVS SNAPSHOT
[address] [address] 
[phone] Patient: Liz Adams MRN: [id number] FCK:4/93/2184 Visit Information Date & Time Provider Department Dept. Phone Encounter #  
 7/17/2018  1:30 PM [facility] [phone] [id number] Upcoming Health Maintenance Date Due Hepatitis C Screening 1960 DTaP/Tdap/Td series (1 - Tdap) 4/28/1979 PAP AKA CERVICAL CYTOLOGY 4/28/1979 BREAST CANCER SCRN MAMMOGRAM 4/28/2008 FOBT Q 1 YEAR AGE 50-75 4/28/2008 ZOSTER VACCINE AGE 60> 2/28/2018 Influenza Age 5 to Adult 8/1/2018 Allergies as of 7/17/2018  Review Complete On: 7/17/2018 By: Olga Klein MD  
  
 Severity Noted Reaction Type Reactions Bee Venom Protein (Honey Bee) High 07/13/2018    Anaphylaxis Lemon  06/26/2018    Hives  
 fresh Vicodin [Hydrocodone-acetaminophen]  06/26/2018    Hives, Itching, Swelling Current Immunizations  Reviewed on 7/13/2018 No immunizations on file. Not reviewed this visit Vitals BP Pulse Temp Resp Height(growth percentile) Weight(growth percentile) 120/70 (BP 1 Location: Left arm, BP Patient Position: Sitting) 86 98.2 °F (36.8 °C) (Oral) 20 5' 7\" (1.702 m) 174 lb 12.8 oz (79.3 kg) SpO2 BMI OB Status Smoking Status 97% 27.38 kg/m2 Hysterectomy Former Smoker BMI and BSA Data Body Mass Index Body Surface Area  
 27.38 kg/m 2 1.94 m 2 Preferred Pharmacy Pharmacy Name Phone [id number] - [place], [address] [phone] Your Updated Medication List  
  
   
This list is accurate as of 7/17/18  2:03 PM.  Always use your most recent med list.  
  
  
  
  
 atorvastatin 20 mg tablet Commonly known as:  LIPITOR  
TAKE 1 TABLET BY MOUTH bedtime  
  
 clobetasol 0.05 % ointment Commonly known as:  Rupal Fowler Apply  to affected area. diclofenac EC 75 mg EC tablet Commonly known as:  VOLTAREN Take  by mouth two (2) times daily as needed. ENBREL 50 mg/mL (0.98 mL) injection Generic drug:  etanercept  
by SubCUTAneous route Every Thursday. hydrOXYzine HCl 10 mg tablet Commonly known as:  ATARAX Take 10 mg by mouth. Indications: psoriasis  
  
 metFORMIN 500 mg tablet Commonly known as:  GLUCOPHAGE Take 500 mg by mouth nightly. Indications: type 2 diabetes mellitus  
  
 oxyCODONE-acetaminophen 5-325 mg per tablet Commonly known as:  PERCOCET Take 1 Tab by mouth every four (4) hours as needed. Max Daily Amount: 6 Tabs. PROBIOTIC 4X PO Take 1 Tab by mouth. traZODone 150 mg tablet Commonly known as:  Machuca Cower 1 po qhs  
  
 urea 40 % topical cream  
Commonly known as:  CARMOL  
NAY MARBLE SIZED AMOUNT TO THICKENED AREAS ON BODY ONCE TO BID PRN  
  
 XANAX 2 mg tablet Generic drug:  ALPRAZolam  
1 tablet by mouth every 8 hours as needed for severe anxiety Introducing [facility]! Dear Casey Miranda: 
Thank you for requesting a [employer] account. Our records indicate that you already have an active [employer] account. You can access your account anytime at [email or web address] Did you know that you can access your hospital and ER discharge instructions at any time in [employer]? You can also review all of your test results from your hospital stay or ER visit. Additional Information If you have questions, please visit the Frequently Asked Questions section of the [employer] website at [email or web address] Remember, [employer] is NOT to be used for urgent needs. For medical emergencies, dial 911. Now available from your iPhone and Android! Please provide this summary of care documentation to your next provider. Your primary care clinician is listed as Phys Other.  If you have any questions after today's visit, please call [phone].

## 2018-07-17 NOTE — PROGRESS NOTES
[address], [address], [address] [facility]  [address] [facility], [address] [place] [address]  [place], [address]   [phone]  Nancy Hidden DO      Patient ID:  Name:  Alexei Diallo  MRN:  [id number]  :  1960/58 y.o. Date:  2018      HISTORY OF PRESENT ILLNESS:  Alexei Diallo is a 59 y.o.  postmenopausal female referred by Dr. aKren Smith for pelvic mass. Pt underwent  TRACHELECTOMY,ANTERIOR AND POSTERIOR REPAIR,SACROSPINOUS LIGAMENT SUSPENSION,CYSTOSCOPY on 2018 with dr. Karen Smith for prolapse and was found to have a large abdominopelvic mass. A CT was performed as below. 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 No  pericardial or pleural effusion.     LIVER, BILIARY: No suspicious enhancing hepatic mass or lesion. Mild  intrahepatic and extra ventricular duct dilatation, favoring post  cholecystectomy reservoir artifact.     PANCREAS: No suspicious enhancing mass or lesion. Within normal limits.     SPLEEN: Normal in size and attenuation. Rounded 7 mm splenule .     ADRENALS: Normal.     KIDNEYS: Symmetric enhancing bilateral kidneys with mild right-sided  hydronephrosis and hydroureter.  No discrete left-sided hydronephrosis or left  hydroureter.     LYMPH NODES: Distal left periaortic 8mm AP dimension by 2.4 cm (S/I) tubular  shaped structure (axial image 65/coronal image 57) which may represent an  enlarged lymph node versus dilated vascular structure.        GASTROINTESTINAL TRACT: No bowel dilation or wall thickening.     PELVIC ORGANS, OTHER: Large multilobulated enhancing solid soft tissue pelvic  mass extending to the mid abdomen, measuring 27.4 x 21.5 x 11.2 cm (S/I by  transverse by AP). There is adjacent mass effect upon the bladder which is  anteriorly displaced as well as the bowel loops. This mass abuts the cervical  hysterectomy stump with asymmetric right pelvic extension.  -Lateral left mid pelvic plaque-like soft tissue measuring 2.9 x 1.4 cm (image  82) with additional areas of subtle nodularity anterior left hemipelvis (image  77) and right lateral abdomen (image 66).    VASCULATURE: Unremarkable.     BONES: No acute or aggressive osseous abnormalities identified.     OTHER: Right posterior inferior gluteal soft tissue enhancing mass measuring 1.5  cm (axial image 132). .     _______________     IMPRESSION  IMPRESSION:     1. Large soft tissue enhancing multilobulated mass arising from the lower  pelvis, probably right adnexa extending to the mid abdomen, measuring 27 x 21 x  11 cm, in a patient with prior hysterectomy. Differential considerations are  highly concerning for ovarian neoplasm. Recommend gynecologic oncology  consultation.        2. Left lateral pelvic plaque-like soft tissue and right mid abdomen 5 mm  nodule, concerning for peritoneal metastasis.     3. Partial visualized right lower lobe spiculated 1.5 cm nodule, metastatic  disease versus primary pulmonary malignancy. Recommend follow-up dedicated  contrast-enhanced completion CT scan of the chest, which can be performed on a  nonemergent basis.     4. Right posterior inferior buttock partial visualized 1.5 cm rim-enhancing soft  tissue nodule. While indeterminate there is concern that this may represent soft  tissue metastasis.        ROS:   As above      Patient Active Problem List    Diagnosis Date Noted    Pelvic relaxation due to cervical stump prolpase 07/13/2018     Past Medical History:   Diagnosis Date  Arthritis     knees    Autoimmune disease ([facility]     psoriasis    Diabetes ([facility] 2018    Psoriasis     Psychiatric disorder     anxiety      Past Surgical History:   Procedure Laterality Date    ABDOMEN SURGERY PROC UNLISTED      12 inch of colon removed r/t non cancerous tumor    HX APPENDECTOMY      HX CHOLECYSTECTOMY      HX HYSTERECTOMY      HX TONSILLECTOMY      HX TUBAL LIGATION      HX UROLOGICAL      bladder lift      OB History      Para Term  AB Living    2 2 2   2    SAB TAB Ectopic Molar Multiple Live Births         2        Social History   Substance Use Topics    Smoking status: Former Smoker    Smokeless tobacco: Never Used    Alcohol use No      Family History   Problem Relation Age of Onset    Diabetes Mother     Coronary Artery Disease Mother     Alzheimer Mother     Anxiety Mother     Hypertension Father     Emphysema Father       Current Outpatient Prescriptions   Medication Sig    ALPRAZolam (XANAX) 2 mg tablet 1 tablet by mouth every 8 hours as needed for severe anxiety    atorvastatin (LIPITOR) 20 mg tablet TAKE 1 TABLET BY MOUTH bedtime    clobetasol (TEMOVATE) 0.05 % ointment Apply  to affected area.  hydrOXYzine HCl (ATARAX) 10 mg tablet Take 10 mg by mouth. Indications: psoriasis    metFORMIN (GLUCOPHAGE) 500 mg tablet Take 500 mg by mouth nightly. Indications: type 2 diabetes mellitus    traZODone (DESYREL) 150 mg tablet 1 po qhs    urea (CARMOL) 40 % topical cream NAY MARBLE SIZED AMOUNT TO THICKENED AREAS ON BODY ONCE TO BID PRN    diclofenac EC (VOLTAREN) 75 mg EC tablet Take  by mouth two (2) times daily as needed.  oxyCODONE-acetaminophen (PERCOCET) 5-325 mg per tablet Take 1 Tab by mouth every four (4) hours as needed. Max Daily Amount: 6 Tabs.  B infantis/B ani/B cristine/B bifid (PROBIOTIC 4X PO) Take 1 Tab by mouth.  etanercept (ENBREL) 50 mg/mL (0.98 mL) injection by SubCUTAneous route Every Thursday.      No current facility-administered medications for this visit. Allergies   Allergen Reactions    Bee Venom Protein (Honey Bee) Anaphylaxis    Lemon Hives     fresh    Vicodin [Hydrocodone-Acetaminophen] Hives, Itching and Swelling          OBJECTIVE:    Physical Exam  VITAL SIGNS: Visit Vitals    /70 (BP 1 Location: Left arm, BP Patient Position: Sitting)    Pulse 86    Temp 98.2 °F (36.8 °C) (Oral)    Resp 20    Ht 5' 7\" (1.702 m)    Wt 79.3 kg (174 lb 12.8 oz)    SpO2 97%    BMI 27.38 kg/m2      GENERAL NAY: in no apparent distress and well developed and well nourished   MUSCULOSKEL: no joint tenderness, deformity or swelling   INTEGUMENT:  warm and dry, no rashes or lesions   ABDOMEN . soft, NT, ND, pelvic mass palpable to deep palpation   EXTREMITIES: extremities normal, atraumatic, no cyanosis or edema   PELVIC: Exam deferred. RECTAL: deferred   SHIRLENE SURVEY: Cervical, supraclavicular, axillary and inguinal nodes normal.   NEURO: Grossly normal         IMPRESSION/PLAN:  1. Pelvic mass, pulmonary nodules, peritoneal nodules, buttock nodule   -reviewed her imaging and explained worrisome for cancer    -imaging not classic for ovarian cancer; ?  Possible sarcoma   -will f/u tumor markers and call with results   -will get dedicated chest CT  And likely schedule a biopsy of lung lesion   -discussed if it is an ovarian cancer would require some combination of surgery and chemotherapy   -    The total time spent was 60 minutes regarding this patients diagnosis of pelvic mass, pulmonary nodules and >50% of this time was spent counseling and coordinating care    [address] [place] [address] Oncology  5/54/54290:17 AM

## 2018-07-17 NOTE — PROGRESS NOTES
Khalif Sanchez, a 59 y.o. female,  is here for   Chief Complaint   Patient presents with    New Patient     referred by Dr. Livan Armstrong /70 (BP 1 Location: Left arm, BP Patient Position: Sitting)    Pulse 86    Temp 98.2 °F (36.8 °C) (Oral)    Resp 20    Ht 5' 7\" (1.702 m)    Wt 79.3 kg (174 lb 12.8 oz)    SpO2 97%    BMI 27.38 kg/m2     Patient does report having diarrhea recently, last episode was 07/16/2018, has been happening for 6 weeks \"off and on, more on than off. \"  Does report a history of having either constipation or diarrhea since having part of her colon removed. Patient denies any persistent or worsening abdominal or pelvic pain. Denies any unusual vaginal bleeding, discharge, irritation, or odor. No burning, discomfort, or irritation with urination.

## 2018-07-18 ENCOUNTER — TELEPHONE (OUTPATIENT)
Dept: ONCOLOGY | Age: 58
End: 2018-07-18

## 2018-07-18 DIAGNOSIS — R91.1 PULMONARY NODULE: Primary | ICD-10-CM

## 2018-07-18 NOTE — TELEPHONE ENCOUNTER
Received a message via sticky note from [facility] stating that the patient would like her CT scan done at [facility] due to her insurance. Called [facility] this morning who expressed that she did not have any appointments until 07/31/2018. Called patient and relayed information, patient stated that she was already checked in and waiting for her CT at [address] [address]. Explained that she called on a conference call with her insurance company and spoke to someone who stated that she can have her CT done anywhere, patient stated she was going to have it done at [address] [address]. Her insurance company made the appointment. Seferino Palmer from [address] [place] [address] called this morning immediately after I spoke with the patient and stated that they needed the order requisition faxed over, scan was faxed this morning to [phone].

## 2018-07-18 NOTE — TELEPHONE ENCOUNTER
Due to patient's anxiety, I called to inform her that her CT results have not been resulted yet and that they make take 24-48 hours to be resulted. Patient stated that [facility]. had told her that they would have her results today due to the confusion and long wait time. Will attempt to call them later and verify. Patient requested a phone call with her lab and CT results ASAP.

## 2018-07-19 ENCOUNTER — TELEPHONE (OUTPATIENT)
Dept: ONCOLOGY | Age: 58
End: 2018-07-19

## 2018-07-19 DIAGNOSIS — R91.8 PULMONARY NODULES/LESIONS, MULTIPLE: Primary | ICD-10-CM

## 2018-07-19 DIAGNOSIS — R91.1 PULMONARY NODULE: ICD-10-CM

## 2018-07-19 LAB
CANCER AG125 SERPL-ACNC: 18.6 U/ML (ref 0–38.1)
CANCER AG19-9 SERPL-ACNC: 5 U/ML (ref 0–35)

## 2018-07-19 NOTE — TELEPHONE ENCOUNTER
Informed client that the order has been placed for the IR guided lung biopsy and the scheduling department will be contacting her soon. Also informed client that her CEA, CANCER AG 19-9 and  were all within normal limits. Patient verbalized understanding. Patient agreed to plan. Patient instructed to contact office for any question or concerns.

## 2018-07-19 NOTE — TELEPHONE ENCOUNTER
Order placed for right lung biopsy. Spoke with Ladi Lechuga to schedule IR guided lung biopsy for patient. Ladi Lechuga states that she will schedule biopsy ASAP and call the patient. I requested a call as well regarding scheduling time and date.

## 2018-07-19 NOTE — TELEPHONE ENCOUNTER
Patient called to inquire if her CT results had been received by the office and was informed that the fax has not come through yet. She was notified that TOBIAS Kendall was in the [facility] office until this afternoon but I would call [facility] to request the results be faxed to us. Will call the patient with an update or to let her know results were received. Patient agreeable with this and requested we let her know if there was anything she could do to expedite the process.

## 2018-07-20 NOTE — TELEPHONE ENCOUNTER
Informed client that lung biopsy is scheduled for 07/31/2018 at [facility]. Informed client that IR staff needs films from lung CT from [facility].. Client reports she is currently in route to deliver digital image of scan. Patient verbalized understanding. Patient agreed to plan. Patient instructed to contact office for any question or concerns.

## 2018-07-24 ENCOUNTER — TELEPHONE (OUTPATIENT)
Dept: ONCOLOGY | Age: 58
End: 2018-07-24

## 2018-07-24 NOTE — TELEPHONE ENCOUNTER
Chief Complaint   Patient presents with   • Ankle Injury     LEFT ANKLE, pt fell 08/02/2017 injured ankle in fall, since injury swelling/bruising has decreased but pain is still present, 3/10 pain, ice/heat/elevation/aleeve used with some relief, pt concred because there is a very tender area above ankle on left foot painful to touch, sx 2 weeks       HISTORY OF PRESENT ILLNESS:  Daphnie Garcia is a 59 year old who presents with left ankle moderate pain/injury. On 8/2//17, states passing out while at dinner. Had previously done 3 mile walk. Was seen by Paramedics with EKG, blood sugar and BP being normal. Was told that she was dehydrated. Had positive skin turgor noted by Paramedics. Was not transferred to ER because the patient declined.  Aleve medication taken with little relief. Walking makes symptoms worse. Aleve, ice and elevation makes symptoms improved. Patient denies any swelling, bruising or open cuts. Patient denies any recent travel.    PAST MEDICAL HISTORY, PAST SURGICAL HISTORY, SOCIAL HISTORY, MEDICATIONS, AND ALLERGIES:  Reviewed per electronic chart.  Past Medical History:   Diagnosis Date   • Arthritis    • Higuera esophagus    • Esophageal reflux     Barretts   • Fracture     Toe   • Grave's disease    • Graves' disease    • Hypoparathyroidism (CMS/HCC)    • Hypothyroid    • Personal history of traumatic fracture     5th toe   • Pneumonia    • RAD (reactive airway disease)    • Seasonal allergies    • Unspecified hypothyroidism    • Unspecified sinusitis (chronic)      Past Surgical History:   Procedure Laterality Date   • ORIF FOOT FRACTURE  July 2014    K wire placed and removed 7/31/14   • SERVICE TO GASTROENTEROLOGY      EGD 2012   • SERVICE TO GASTROENTEROLOGY      colonoscopy   • THYROIDECTOMY, PARTIAL     • TONSILLECTOMY AND ADENOIDECTOMY       Social History     Social History   • Marital status:      Spouse name: N/A   • Number of children: N/A   • Years of education: N/A  Patient called about results in [facility]  but is unable to see them. Patient would like the Nurse or Dr. Brenda Barba to contact her with results or information on how to review results on [facility].     Social History Main Topics   • Smoking status: Former Smoker     Quit date: 12/3/2000   • Smokeless tobacco: Never Used   • Alcohol use 3.6 - 4.2 oz/week     6 - 7 Standard drinks or equivalent per week   • Drug use: No   • Sexual activity: Not Asked     Other Topics Concern   • None     Social History Narrative   • None     Current Outpatient Prescriptions   Medication Sig Dispense Refill   • DISPENSE [place] thyroid, 150mg, take one tablet q day, compounded. 90 tablet 3   • Biotin 5 MG Cap      • loratadine (CLARITIN) 10 MG tablet Take 1 tablet by mouth nightly. 0 30 tablet 0   • Flaxseed, Linseed, (FLAXSEED OIL) OIL Take 2,000 mg by mouth.     • B Complex Vitamins (VITAMIN-B COMPLEX PO) Take  by mouth.     • calcium gluconate 650 MG tablet Take 650 mg by mouth daily.     • Cholecalciferol (VITAMIN D3) 2000 UNITS TABS Take 3 tablets by mouth.     • HYDROcodone-acetaminophen (NORCO) 5-325 MG per tablet Take 1 tablet by mouth every 6 hours as needed for Pain. 12 tablet 0   • HYDROcodone-acetaminophen (NORCO) 5-325 MG per tablet Take 1 tablet by mouth every 8 hours as needed for Pain. 10 tablet 0   • fluticasone (FLONASE) 50 MCG/ACT nasal spray Spray 2 sprays in each nostril daily. 16 g 3   • albuterol 108 (90 BASE) MCG/ACT inhaler Inhale 2 puffs into the lungs every 4 hours as needed for Shortness of Breath or Wheezing. 1 Inhaler 0   • metroNIDAZOLE (METROGEL) 1 % gel Apply to affected areas qhs 55 g 11     No current facility-administered medications for this visit.      ALLERGIES:   Allergen Reactions   • Aspirin GI UPSET     Family History   Problem Relation Age of Onset   • Cancer Father      lung   • Heart disease Sister      congenital   • Thyroid Sister    • Asthma Brother      childhood   • Heart disease Brother      congenital   • Diabetes Maternal Aunt    • Diabetes Paternal Aunt    • Mult Sclerosis Mother    • Glaucoma Maternal Grandmother    • Cataracts Maternal Grandmother    • Cataracts Maternal  Grandfather    • Migraine Maternal Grandfather          REVIEW OF SYSTEMS:  Constitutional:  Denies fever. Denies body aches. Denies chills.  Skin:  Denies rash. Denies burn. Denies abrasion. Denies bruising. Denies erythema. Denies abscess. Denies swelling. Denies laceration.  Eyes:  Denies visual acuity changes. Denies eye drainage. Denies eye redness.   Respiratory:  Denies cough. Denies wheezing. Denies shortness of breath.   Cardiovascular:  Denies chest pain. Denies palpitations.   Musculoskeletal:  Positive joint pain. Denies joint swelling. Denies back pain. Denies neck pain.   Neurologic:  Denies change in visual acuity  Denies sensory function changes. Denies motor function changes. Denies headache.  Psychiatric:  Denies change in affect. Denies anxiety. Denies depression. Denies sleep disturbance.        PHYSICAL EXAM:  Vitals:    Visit Vitals  /81   Pulse 74   Temp 98.6 °F (37 °C) (Oral)     Constitutional:  No acute distress. Non-toxic appearance. Patient is interactive and pleasant.   Skin:  Warm and dry. No rash.   HENT:  Normocephalic, atraumatic. Bilateral external ears normal. Oropharynx moist. No oral exudates. Nose normal.  Eyes:  Pupils are equal, round, reactive to light and accommodation.  Extraocular muscles are intact. Bilateral conjunctivae are normal.   Cardiovascular:  Normal heart rate. Regular rhythm. No murmurs.    Pulmonary/Chest:  Clear to auscultate bilaterally. Symmetrical chest expansion. No wheezing. No crackles. No diminished breath sounds.   Left ankle (superior to lateral malleolus): Tender to palpate. No bruising. Minimal swelling. No erythema. No deformity. Normal strength. Normal 2-point discoloration. No abrasion. No open wound or laceration. Full range of motion.  Neurologic Exam:  Alert, active and oriented x 3. Cranial nerves II through XII are intact. No focal deficits.   Psychiatric:  Cooperative, appropriate mood and affect.      LABS/RADIOLOGY:  Orders Placed  This Encounter   • XR Ankle 3+ View Left   • HYDROcodone-acetaminophen (NORCO) 5-325 MG per tablet         ASSESSMENT:  1. Injury of left ankle, initial encounter    2. Ankle fracture, left, closed, initial encounter        PLAN:  M.D. reviewed x-ray with the patient. M.D. suggested patient start Norco as directed. May take extra strength Tylenol or ibuprofen as needed. Splint and crutches. Nonweightbearing with crutches. Ice, elevate and rest. Follow-up with orthopedist within one week.    Follow up with primary if no improvement of symptoms or if symptoms worsen, please be evaluated at the EMERGENCY ROOM.     Patient acknowledged understanding of the instructions.

## 2018-07-31 ENCOUNTER — APPOINTMENT (OUTPATIENT)
Dept: GENERAL RADIOLOGY | Age: 58
End: 2018-07-31
Attending: RADIOLOGY
Payer: COMMERCIAL

## 2018-07-31 ENCOUNTER — HOSPITAL ENCOUNTER (OUTPATIENT)
Dept: CT IMAGING | Age: 58
Discharge: HOME | End: 2018-07-31
Attending: RADIOLOGY | Admitting: RADIOLOGY
Payer: COMMERCIAL

## 2018-07-31 VITALS
TEMPERATURE: 97.9 F | WEIGHT: 174.7 LBS | HEIGHT: 68 IN | SYSTOLIC BLOOD PRESSURE: 100 MMHG | OXYGEN SATURATION: 98 % | BODY MASS INDEX: 26.48 KG/M2 | DIASTOLIC BLOOD PRESSURE: 73 MMHG | HEART RATE: 60 BPM | RESPIRATION RATE: 12 BRPM

## 2018-07-31 DIAGNOSIS — R91.8 PULMONARY NODULES/LESIONS, MULTIPLE: ICD-10-CM

## 2018-07-31 LAB
ANION GAP SERPL CALC-SCNC: 5 MMOL/L (ref 3–18)
APTT PPP: 27.5 SEC (ref 23–36.4)
BASOPHILS # BLD: 0 K/UL (ref 0–0.1)
BASOPHILS NFR BLD: 0 % (ref 0–2)
BUN SERPL-MCNC: 16 MG/DL (ref 7–18)
BUN/CREAT SERPL: 20 (ref 12–20)
CALCIUM SERPL-MCNC: 9.4 MG/DL (ref 8.5–10.1)
CHLORIDE SERPL-SCNC: 105 MMOL/L (ref 100–108)
CO2 SERPL-SCNC: 29 MMOL/L (ref 21–32)
CREAT SERPL-MCNC: 0.81 MG/DL (ref 0.6–1.3)
DIFFERENTIAL METHOD BLD: ABNORMAL
EOSINOPHIL # BLD: 0.5 K/UL (ref 0–0.4)
EOSINOPHIL NFR BLD: 9 % (ref 0–5)
ERYTHROCYTE [DISTWIDTH] IN BLOOD BY AUTOMATED COUNT: 13.9 % (ref 11.6–14.5)
GLUCOSE SERPL-MCNC: 106 MG/DL (ref 74–99)
HCT VFR BLD AUTO: 43.9 % (ref 35–45)
HGB BLD-MCNC: 14.4 G/DL (ref 12–16)
INR PPP: 0.9 (ref 0.8–1.2)
LYMPHOCYTES # BLD: 2.3 K/UL (ref 0.9–3.6)
LYMPHOCYTES NFR BLD: 39 % (ref 21–52)
MCH RBC QN AUTO: 29 PG (ref 24–34)
MCHC RBC AUTO-ENTMCNC: 32.8 G/DL (ref 31–37)
MCV RBC AUTO: 88.3 FL (ref 74–97)
MONOCYTES # BLD: 0.3 K/UL (ref 0.05–1.2)
MONOCYTES NFR BLD: 6 % (ref 3–10)
NEUTS SEG # BLD: 2.8 K/UL (ref 1.8–8)
NEUTS SEG NFR BLD: 46 % (ref 40–73)
PLATELET # BLD AUTO: 303 K/UL (ref 135–420)
PMV BLD AUTO: 9.7 FL (ref 9.2–11.8)
POTASSIUM SERPL-SCNC: 3.9 MMOL/L (ref 3.5–5.5)
PROTHROMBIN TIME: 11.9 SEC (ref 11.5–15.2)
RBC # BLD AUTO: 4.97 M/UL (ref 4.2–5.3)
SODIUM SERPL-SCNC: 139 MMOL/L (ref 136–145)
WBC # BLD AUTO: 6 K/UL (ref 4.6–13.2)

## 2018-07-31 PROCEDURE — 85610 PROTHROMBIN TIME: CPT | Performed by: NURSE PRACTITIONER

## 2018-07-31 PROCEDURE — 85730 THROMBOPLASTIN TIME PARTIAL: CPT | Performed by: NURSE PRACTITIONER

## 2018-07-31 PROCEDURE — 74011000250 HC RX REV CODE- 250

## 2018-07-31 PROCEDURE — 80048 BASIC METABOLIC PNL TOTAL CA: CPT | Performed by: NURSE PRACTITIONER

## 2018-07-31 PROCEDURE — 71045 X-RAY EXAM CHEST 1 VIEW: CPT

## 2018-07-31 PROCEDURE — 99153 MOD SED SAME PHYS/QHP EA: CPT

## 2018-07-31 PROCEDURE — 99152 MOD SED SAME PHYS/QHP 5/>YRS: CPT

## 2018-07-31 PROCEDURE — 74011000250 HC RX REV CODE- 250: Performed by: RADIOLOGY

## 2018-07-31 PROCEDURE — 85025 COMPLETE CBC W/AUTO DIFF WBC: CPT | Performed by: NURSE PRACTITIONER

## 2018-07-31 PROCEDURE — 88341 IMHCHEM/IMCYTCHM EA ADD ANTB: CPT | Performed by: OBSTETRICS & GYNECOLOGY

## 2018-07-31 PROCEDURE — 88334 PATH CONSLTJ SURG CYTO XM EA: CPT | Performed by: OBSTETRICS & GYNECOLOGY

## 2018-07-31 PROCEDURE — 88333 PATH CONSLTJ SURG CYTO XM 1: CPT | Performed by: OBSTETRICS & GYNECOLOGY

## 2018-07-31 PROCEDURE — 88342 IMHCHEM/IMCYTCHM 1ST ANTB: CPT | Performed by: OBSTETRICS & GYNECOLOGY

## 2018-07-31 PROCEDURE — 74011250636 HC RX REV CODE- 250/636: Performed by: RADIOLOGY

## 2018-07-31 PROCEDURE — C2613 LUNG BX PLUG W/DEL SYS: HCPCS

## 2018-07-31 PROCEDURE — 88305 TISSUE EXAM BY PATHOLOGIST: CPT | Performed by: OBSTETRICS & GYNECOLOGY

## 2018-07-31 RX ORDER — MIDAZOLAM HYDROCHLORIDE 1 MG/ML
.5-4 INJECTION, SOLUTION INTRAMUSCULAR; INTRAVENOUS
Status: DISCONTINUED | OUTPATIENT
Start: 2018-07-31 | End: 2018-07-31 | Stop reason: HOSPADM

## 2018-07-31 RX ORDER — NALOXONE HYDROCHLORIDE 0.4 MG/ML
0.2 INJECTION, SOLUTION INTRAMUSCULAR; INTRAVENOUS; SUBCUTANEOUS AS NEEDED
Status: DISCONTINUED | OUTPATIENT
Start: 2018-07-31 | End: 2018-07-31 | Stop reason: HOSPADM

## 2018-07-31 RX ORDER — SODIUM CHLORIDE 9 MG/ML
25 INJECTION, SOLUTION INTRAVENOUS CONTINUOUS
Status: DISCONTINUED | OUTPATIENT
Start: 2018-07-31 | End: 2018-07-31 | Stop reason: HOSPADM

## 2018-07-31 RX ORDER — FENTANYL CITRATE 50 UG/ML
25-200 INJECTION, SOLUTION INTRAMUSCULAR; INTRAVENOUS
Status: DISCONTINUED | OUTPATIENT
Start: 2018-07-31 | End: 2018-07-31 | Stop reason: HOSPADM

## 2018-07-31 RX ORDER — SODIUM BICARBONATE 1 MEQ/ML
SYRINGE (ML) INTRAVENOUS
Status: COMPLETED
Start: 2018-07-31 | End: 2018-07-31

## 2018-07-31 RX ORDER — LIDOCAINE HYDROCHLORIDE 10 MG/ML
1-20 INJECTION INFILTRATION; PERINEURAL
Status: DISCONTINUED | OUTPATIENT
Start: 2018-07-31 | End: 2018-07-31 | Stop reason: HOSPADM

## 2018-07-31 RX ORDER — SODIUM BICARBONATE 84 MG/ML
1 INJECTION, SOLUTION INTRAVENOUS
Status: DISCONTINUED | OUTPATIENT
Start: 2018-07-31 | End: 2018-07-31 | Stop reason: HOSPADM

## 2018-07-31 RX ORDER — FLUMAZENIL 0.1 MG/ML
0.2 INJECTION INTRAVENOUS
Status: DISCONTINUED | OUTPATIENT
Start: 2018-07-31 | End: 2018-07-31 | Stop reason: HOSPADM

## 2018-07-31 RX ADMIN — SODIUM BICARBONATE 1 MEQ: 84 INJECTION INTRAVENOUS at 10:00

## 2018-07-31 RX ADMIN — FENTANYL CITRATE 25 MCG: 50 INJECTION, SOLUTION INTRAMUSCULAR; INTRAVENOUS at 11:20

## 2018-07-31 RX ADMIN — MIDAZOLAM HYDROCHLORIDE 0.5 MG: 1 INJECTION, SOLUTION INTRAMUSCULAR; INTRAVENOUS at 11:10

## 2018-07-31 RX ADMIN — FENTANYL CITRATE 25 MCG: 50 INJECTION, SOLUTION INTRAMUSCULAR; INTRAVENOUS at 11:10

## 2018-07-31 RX ADMIN — SODIUM CHLORIDE 25 ML/HR: 900 INJECTION, SOLUTION INTRAVENOUS at 09:28

## 2018-07-31 RX ADMIN — MIDAZOLAM HYDROCHLORIDE 0.5 MG: 1 INJECTION, SOLUTION INTRAMUSCULAR; INTRAVENOUS at 11:20

## 2018-07-31 RX ADMIN — LIDOCAINE HYDROCHLORIDE 10 ML: 10 INJECTION, SOLUTION INFILTRATION; PERINEURAL at 11:00

## 2018-07-31 NOTE — PROCEDURES
Vascular & Interventional Radiology Brief Procedure Note    Interventional Radiologist: Albaro Lee MD    Pre-operative Diagnosis:  RLL nodule    Post-operative Diagnosis: Same as pre-op dx    Procedure(s) Performed:  CT guided bx    Anesthesia:  Local and Moderate Sedation    Findings:  Successful RLL nodule biopsy. Complications: None    Estimated Blood Loss:  minimal    Tubes and Drains: None    Specimens: 5, 20g cores. Condition: Good     Plan: cxr pending.        Albaro Lee MD  [facility]  Vascular & Interventional Radiology  7/31/2018

## 2018-07-31 NOTE — H&P
The patient is an appropriate candidate to undergo lung bx. Patient assessed immediately prior to induction. Anesthesia plan as follows:   Conscious Sedation. Planned agent(s):  fentanyl and versed    ASA Score:  ASA 2 - Mild systemic disease    History and Physical update:  H&P was reviewed and the patient was examined. No changes have occurred in the patient's condition since the H&P was completed.     Lydia Mosqueda MD  Vascular & Interventional Radiology  [facility]  7/31/2018

## 2018-07-31 NOTE — PROGRESS NOTES
Prepped and ready for procedure. 1150 back from procedure. Band-aid intact to right mid back no bleeding or swelling noted. Denies sob or pain. 1430 port chest xray done as ordered. [phone] discharge instructions reviewed with patient and . Pt verbalized understanding. Dressing to right side of back WNL. .Pt denies sob or pain. Pt discharged home via w/c in care of .

## 2018-07-31 NOTE — DISCHARGE INSTRUCTIONS
DISCHARGE SUMMARY from Nurse    PATIENT INSTRUCTIONS:    After general anesthesia or intravenous sedation, for 24 hours or while taking prescription Narcotics:  · Limit your activities  · Do not drive and operate hazardous machinery  · Do not make important personal or business decisions  · Do  not drink alcoholic beverages  · If you have not urinated within 8 hours after discharge, please contact your surgeon on call. Report the following to your surgeon:  · Excessive pain, swelling, redness or odor of or around the surgical area  · Temperature over 100.5  · Nausea and vomiting lasting longer than 4 hours or if unable to take medications  · Any signs of decreased circulation or nerve impairment to extremity: change in color, persistent  numbness, tingling, coldness or increase pain  · Any questions    What to do at Home      *  Please give a list of your current medications to your Primary Care Provider. *  Please update this list whenever your medications are discontinued, doses are      changed, or new medications (including over-the-counter products) are added. *  Please carry medication information at all times in case of emergency situations. These are general instructions for a healthy lifestyle:    No smoking/ No tobacco products/ Avoid exposure to second hand smoke  Surgeon General's Warning:  Quitting smoking now greatly reduces serious risk to your health. Obesity, smoking, and sedentary lifestyle greatly increases your risk for illness    A healthy diet, regular physical exercise & weight monitoring are important for maintaining a healthy lifestyle    You may be retaining fluid if you have a history of heart failure or if you experience any of the following symptoms:  Weight gain of 3 pounds or more overnight or 5 pounds in a week, increased swelling in our hands or feet or shortness of breath while lying flat in bed. Please call your doctor as soon as you notice any of these symptoms; do not wait until your next office visit. Recognize signs and symptoms of STROKE:    F-face looks uneven    A-arms unable to move or move unevenly    S-speech slurred or non-existent    T-time-call 911 as soon as signs and symptoms begin-DO NOT go       Back to bed or wait to see if you get better-TIME IS BRAIN. Warning Signs of HEART ATTACK     Call 911 if you have these symptoms:   Chest discomfort. Most heart attacks involve discomfort in the center of the chest that lasts more than a few minutes, or that goes away and comes back. It can feel like uncomfortable pressure, squeezing, fullness, or pain.  Discomfort in other areas of the upper body. Symptoms can include pain or discomfort in one or both arms, the back, neck, jaw, or stomach.  Shortness of breath with or without chest discomfort.  Other signs may include breaking out in a cold sweat, nausea, or lightheadedness. Don't wait more than five minutes to call 911 - MINUTES MATTER! Fast action can save your life. Calling 911 is almost always the fastest way to get lifesaving treatment. Emergency Medical Services staff can begin treatment when they arrive -- up to an hour sooner than if someone gets to the hospital by car. The discharge information has been reviewed with the patient and spouse. The patient and spouse verbalized understanding. Discharge medications reviewed with the patient and spouse and appropriate educational materials and side effects teaching were provided.   ___________________________________________________________________________________________________________________________________                Mariajose Fails, Signature                                                                           7/31/2018  Osbaldo Frausto RN          Percutaneous Lung Biopsy: What to Expect at Home  Your Recovery    A percutaneous (say \"per-kew-SELWYN-nee-us) lung biopsy is a procedure to take a sample (biopsy) of lung tissue. The doctor puts a long needle through your chest wall to get the sample. Another doctor will look at the lung tissue with a microscope to check for infection, cancer, or other lung problems. This procedure is also called a needle biopsy. You may be sore where the doctor made the cut (incision) in your skin and put in the biopsy needle. You may feel some pain in your lung when you take a deep breath. These symptoms usually get better in a few days. If you cough up mucus, there may be streaks of blood in the mucus for the first week after the procedure. You may need to take it easy at home for a day or two after the procedure. For 1 week, try to avoid heavy lifting and strenuous activities. These activities could cause bleeding from the biopsy site. It can take several days to get the results of the biopsy. The doctor or nurse will discuss the results with you. This care sheet gives you a general idea about how long it will take for you to recover. But each person recovers at a different pace. Follow the steps below to feel better as quickly as possible. How can you care for yourself at home? Activity    · Rest when you feel tired. Getting enough sleep will help you recover.     · Try to walk each day. Start by walking a little more than you did the day before. Bit by bit, increase the amount you walk. Walking boosts blood flow and helps prevent pneumonia and constipation.     · Avoid strenuous activities, such as bicycle riding, jogging, weight lifting, or aerobic exercise, for 1 week or until your doctor says it is okay.     · For 1 week, avoid lifting anything that would make you strain. This may include a child, heavy grocery bags and milk containers, a heavy briefcase or backpack, cat litter or dog food bags, or a vacuum .     · Ask your doctor when you can drive again.     · You may need to take 1 or 2 days off from work.  It depends on the type of work you do and how you feel.     · You may shower 1 or 2 days after the procedure, if your doctor says it is okay. Pat the incision dry. Do not take a bath for the first week, or until your doctor tells you it is okay.     · Do not fly in an airplane or dive deeply (such as in scuba diving) until your doctor tells you it is okay. Avoid any situations where there is increased air pressure. Diet    · You can eat your normal diet. If your stomach is upset, try bland, low-fat foods like plain rice, broiled chicken, toast, and yogurt. Medicines    · Your doctor will tell you if and when you can restart your medicines. He or she will also give you instructions about taking any new medicines.     · If you take blood thinners, such as warfarin (Coumadin), clopidogrel (Plavix), or aspirin, be sure to talk to your doctor. He or she will tell you if and when to start taking those medicines again. Make sure that you understand exactly what your doctor wants you to do.     · Be safe with medicines. Take pain medicines exactly as directed. ¨ If the doctor gave you a prescription medicine for pain, take it as prescribed. ¨ If you are not taking a prescription pain medicine, ask your doctor if you can take an over-the-counter medicine.     · If you think your pain medicine is making you sick to your stomach:  ¨ Take your medicine after meals (unless your doctor has told you not to). ¨ Ask your doctor for a different pain medicine.     · If your doctor prescribed antibiotics, take them as directed. Do not stop taking them just because you feel better. You need to take the full course of antibiotics. Incision care    · If you have strips of tape on the incision, leave the tape on for a week or until it falls off.     · Wash the area daily with warm, soapy water and pat it dry. Don't use hydrogen peroxide or alcohol, which can slow healing.  You may cover the area with a gauze bandage if it weeps or rubs against clothing. Change the bandage every day.     · Keep the area clean and dry. Follow-up care is a key part of your treatment and safety. Be sure to make and go to all appointments, and call your doctor if you are having problems. It's also a good idea to know your test results and keep a list of the medicines you take. When should you call for help? Call 911 anytime you think you may need emergency care. For example, call if:    · You passed out (lost consciousness).     · You have severe trouble breathing.     · You have sudden chest pain and shortness of breath, or you cough up blood.    Call your doctor now or seek immediate medical care if:    · You are sick to your stomach or cannot keep fluids down.     · You have pain that does not get better after you take pain medicine.     · You have a fever over 100°F.     · You have signs of infection, such as:  ¨ Increased pain, swelling, warmth, or redness. ¨ Red streaks leading from the incision. ¨ Pus draining from the incision. ¨ Swollen lymph nodes in your neck, armpits, or groin. ¨ A fever.     · Bright red blood has soaked through the bandage over your incision.     · You cough up a lot more mucus than normal, or the mucus changes color.    Watch closely for changes in your health, and be sure to contact your doctor if you have any problems. Where can you learn more? Go to http://sujit-colleen.info/. Enter E475 in the search box to learn more about \"Percutaneous Lung Biopsy: What to Expect at Home. \"  Current as of: December 6, 2017  Content Version: 11.7  © 6272-8627 Healthwise, Incorporated. Care instructions adapted under license by [employer] (which disclaims liability or warranty for this information). If you have questions about a medical condition or this instruction, always ask your healthcare professional. [place] [address] any warranty or liability for your use of this information.   Patient yuliana removed and shredded

## 2018-07-31 NOTE — IP AVS SNAPSHOT
Summary of Care Report The Summary of Care report has been created to help improve care coordination. Users with access to [employer] or [address] (Web-based application) may access additional patient information including the Discharge Summary. If you are not currently a [address] user and need more information, please call the number listed below in the [place] [address] section and ask to be connected with Medical Records. Facility Information Name Address Phone [address] [place] [id number] [phone] Patient Information Patient Name Sex JENNIFER García ([id number]) Female 1960 Discharge Information Admitting Provider Service Area Unit Jeff Marie MD / [facility] / [phone] Discharge Provider Discharge Date/Time Discharge Disposition Destination (none) 2018 15:30 (Pending) AHR (none) Patient Language Language ENGLISH [13] Hospital Problems as of 2018  Reviewed: 2018  2:59 PM by Lisa Ortiz MD  
 None Non-Hospital Problems as of 2018  Reviewed: 2018  2:59 PM by Lisa Ortiz MD  
  
  
  
 Class Noted - Resolved Last Modified Active Problems Pelvic relaxation due to cervical stump prolpase  2018 - Present 2018 by Tete Sanders MD  
  Entered by Tete Sanders MD  
  Pulmonary nodules/lesions, multiple  2018 - Present 2018 by Rachel Espinosa NP Entered by Rachel Espinosa NP You are allergic to the following Allergen Reactions Bee Venom Protein (Honey Bee) Anaphylaxis Lemon Hives  
 fresh Vicodin (Hydrocodone-Acetaminophen) Hives Itching Swelling Current Discharge Medication List  
  
ASK your doctor about these medications Dose & Instructions Dispensing Information Comments  
 atorvastatin 20 mg tablet Commonly known as:  LIPITOR  
 TAKE 1 TABLET BY MOUTH bedtime Refills:  0  
   
 clobetasol 0.05 % ointment Commonly known as:  [place] Apply  to affected area. Refills:  0  
   
 diclofenac EC 75 mg EC tablet Commonly known as:  VOLTAREN Take  by mouth two (2) times daily as needed. Refills:  0  
   
 ENBREL 50 mg/mL (0.98 mL) injection Generic drug:  etanercept  
 by SubCUTAneous route Every Thursday. Refills:  0  
   
 hydrOXYzine HCl 10 mg tablet Commonly known as:  ATARAX Dose:  10 mg Take 10 mg by mouth. Indications: psoriasis Refills:  0  
   
 metFORMIN 500 mg tablet Commonly known as:  GLUCOPHAGE Dose:  500 mg Take 500 mg by mouth nightly. Indications: type 2 diabetes mellitus Refills:  0  
   
 oxyCODONE-acetaminophen 5-325 mg per tablet Commonly known as:  PERCOCET Dose:  1 Tab Take 1 Tab by mouth every four (4) hours as needed. Max Daily Amount: 6 Tabs. Quantity:  20 Tab Refills:  0 PROBIOTIC 4X PO Dose:  1 Tab Take 1 Tab by mouth. Refills:  0  
   
 traZODone 150 mg tablet Commonly known as:  [place] 1 po qhs Refills:  0  
   
 urea 40 % topical cream  
Commonly known as:  CARMOL  
 NAY MARBLE SIZED AMOUNT TO THICKENED AREAS ON BODY ONCE TO BID PRN Refills:  0  
   
 XANAX 2 mg tablet Generic drug:  ALPRAZolam  
 1 tablet by mouth every 8 hours as needed for severe anxiety Refills:  0 Surgery Information ID Date/Time Status Primary Surgeon All Procedures Location [id number] 7/31/2018 Unposted   [facility] RAD ANGIO IR OR-DO NOT SCHEDULE Follow-up Information Follow up With Details Comments Contact Info Phys Other, MD   Patient can only remember the practice name and not the physician Discharge Instructions DISCHARGE SUMMARY from Nurse PATIENT INSTRUCTIONS: 
 
 
F-face looks uneven A-arms unable to move or move unevenly S-speech slurred or non-existent T-time-call 911 as soon as signs and symptoms begin-DO NOT go Back to bed or wait to see if you get better-TIME IS BRAIN. Warning Signs of HEART ATTACK Call 911 if you have these symptoms: 
? Chest discomfort. Most heart attacks involve discomfort in the center of the chest that lasts more than a few minutes, or that goes away and comes back. It can feel like uncomfortable pressure, squeezing, fullness, or pain. ? Discomfort in other areas of the upper body. Symptoms can include pain or discomfort in one or both arms, the back, neck, jaw, or stomach. ? Shortness of breath with or without chest discomfort. ? Other signs may include breaking out in a cold sweat, nausea, or lightheadedness. Don't wait more than five minutes to call [address]! Fast action can save your life. Calling 911 is almost always the fastest way to get lifesaving treatment. Emergency Medical Services staff can begin treatment when they arrive  up to an hour sooner than if someone gets to the hospital by car. The discharge information has been reviewed with the patient and spouse. The patient and spouse verbalized understanding. Discharge medications reviewed with the patient and spouse and appropriate educational materials and side effects teaching were provided. ___________________________________________________________________________________________________________________________________ Doris Villalta, Signature                                                                           7/31/2018 Mindy Goodrich, RN Percutaneous Lung Biopsy: What to Expect at [facility] Your Recovery A percutaneous (say \"per-catalina-SELWYN-mahesh-us) lung biopsy is a procedure to take a sample (biopsy) of lung tissue. The doctor puts a long needle through your chest wall to get the sample. Another doctor will look at the lung tissue with a microscope to check for infection, cancer, or other lung problems. This procedure is also called a needle biopsy. You may be sore where the doctor made the cut (incision) in your skin and put in the biopsy needle. You may feel some pain in your lung when you take a deep breath. These symptoms usually get better in a few days. If you cough up mucus, there may be streaks of blood in the mucus for the first week after the procedure. You may need to take it easy at home for a day or two after the procedure. For 1 week, try to avoid heavy lifting and strenuous activities. These activities could cause bleeding from the biopsy site. It can take several days to get the results of the biopsy. The doctor or nurse will discuss the results with you. This care sheet gives you a general idea about how long it will take for you to recover. But each person recovers at a different pace. Follow the steps below to feel better as quickly as possible. How can you care for yourself at home? Activity 
  · Rest when you feel tired. Getting enough sleep will help you recover.  
  · Try to walk each day. Start by walking a little more than you did the day before. Bit by bit, increase the amount you walk. Walking boosts blood flow and helps prevent pneumonia and constipation.  
  · Avoid strenuous activities, such as bicycle riding, jogging, weight lifting, or aerobic exercise, for 1 week or until your doctor says it is okay.  
  · For 1 week, avoid lifting anything that would make you strain. This may include a child, heavy grocery bags and milk containers, a heavy briefcase or backpack, cat litter or dog food bags, or a vacuum .  
  · Ask your doctor when you can drive again.  
  · You may need to take 1 or 2 days off from work.  It depends on the type of work you do and how you feel.  
  · You may shower 1 or 2 days after the procedure, if your doctor says it is okay. Pat the incision dry. Do not take a bath for the first week, or until your doctor tells you it is okay.  
  · Do not fly in an airplane or dive deeply (such as in scuba diving) until your doctor tells you it is okay. Avoid any situations where there is increased air pressure. Diet 
  · You can eat your normal diet. If your stomach is upset, try bland, low-fat foods like plain rice, broiled chicken, toast, and yogurt. Medicines 
  · Your doctor will tell you if and when you can restart your medicines. He or she will also give you instructions about taking any new medicines.  
  · If you take blood thinners, such as warfarin (Coumadin), clopidogrel (Plavix), or aspirin, be sure to talk to your doctor. He or she will tell you if and when to start taking those medicines again. Make sure that you understand exactly what your doctor wants you to do.  
  · Be safe with medicines. Take pain medicines exactly as directed. ¨ If the doctor gave you a prescription medicine for pain, take it as prescribed. ¨ If you are not taking a prescription pain medicine, ask your doctor if you can take an over-the-counter medicine.  
  · If you think your pain medicine is making you sick to your stomach: 
¨ Take your medicine after meals (unless your doctor has told you not to). ¨ Ask your doctor for a different pain medicine.  
  · If your doctor prescribed antibiotics, take them as directed. Do not stop taking them just because you feel better. You need to take the full course of antibiotics. Incision care 
  · If you have strips of tape on the incision, leave the tape on for a week or until it falls off.  
  · Wash the area daily with warm, soapy water and pat it dry. Don't use hydrogen peroxide or alcohol, which can slow healing.  You may cover the area with a gauze bandage if it weeps or rubs against clothing. Change the bandage every day.  
  · Keep the area clean and dry. Follow-up care is a key part of your treatment and safety. Be sure to make and go to all appointments, and call your doctor if you are having problems. It's also a good idea to know your test results and keep a list of the medicines you take. When should you call for help? Call 911 anytime you think you may need emergency care. For example, call if: 
  · You passed out (lost consciousness).  
  · You have severe trouble breathing.  
  · You have sudden chest pain and shortness of breath, or you cough up blood.  
 Call your doctor now or seek immediate medical care if: 
  · You are sick to your stomach or cannot keep fluids down.  
  · You have pain that does not get better after you take pain medicine.  
  · You have a fever over 100°F.  
  · You have signs of infection, such as: 
¨ Increased pain, swelling, warmth, or redness. ¨ Red streaks leading from the incision. ¨ Pus draining from the incision. ¨ Swollen lymph nodes in your neck, armpits, or groin. ¨ A fever.  
  · Bright red blood has soaked through the bandage over your incision.  
  · You cough up a lot more mucus than normal, or the mucus changes color.  
 Watch closely for changes in your health, and be sure to contact your doctor if you have any problems. Where can you learn more? Go to http://sujit-colleen.info/. Enter U987 in the search box to learn more about \"Percutaneous Lung Biopsy: What to Expect at Home. \" Current as of: December 6, 2017 Content Version: 11.7 © 4248-6061 [employer]. Care instructions adapted under license by [employer] (which disclaims liability or warranty for this information).  If you have questions about a medical condition or this instruction, always ask your healthcare professional. [employer] disclaims any warranty or liability for your use of this information. Patient armband removed and shredded Chart Review Routing History No Routing History on File

## 2018-07-31 NOTE — PROGRESS NOTES
Pt arrived on unit; Pt Alert and Oriented; Consent signed;r vital signs. Pt transferred to treatment table for procedure.

## 2018-07-31 NOTE — PROGRESS NOTES
TRANSFER - OUT REPORT:    Verbal report given to Alli Melo RN(name) on Marcus Frank  being transferred to care (unit) for routine progression of care       Report consisted of patients Situation, Background, Assessment and   Recommendations(SBAR). Information from the following report(s) SBAR, Procedure Summary, MAR and Cardiac Rhythm SR/SB was reviewed with the receiving nurse. Lines:   Peripheral IV 07/31/18 Left Antecubital (Active)   Site Assessment Clean, dry, & intact 7/31/2018  9:28 AM   Phlebitis Assessment 0 7/31/2018  9:28 AM   Dressing Status Clean, dry, & intact 7/31/2018  9:28 AM   Dressing Type Transparent;Tape 7/31/2018  9:28 AM   Hub Color/Line Status Blue 7/31/2018  9:28 AM   Action Taken Open ports on tubing capped 7/31/2018  9:28 AM   Alcohol Cap Used Yes 7/31/2018  9:28 AM        Opportunity for questions and clarification was provided.   Discussed rt back bandaid and plug used post procedure    Patient transported with:   Registered Nurse

## 2018-07-31 NOTE — IP AVS SNAPSHOT
[address] [address] 
[phone] Patient: Niya Sanchez MRN: [id number] About your hospitalization You were admitted on:  July 31, 2018 You last received care in the:  [address] You were discharged on:  July 31, 2018 Why you were hospitalized Your primary diagnosis was:  Not on File Follow-up Information Follow up With Details Comments Contact Info Abraham Stephenson, MD   Patient can only remember the practice name and not the physician Discharge Orders None A check huong indicates which time of day the medication should be taken. My Medications ASK your doctor about these medications Instructions Each Dose to Equal  
 Morning Noon Evening Bedtime  
 atorvastatin 20 mg tablet Commonly known as:  LIPITOR Your last dose was: Your next dose is: TAKE 1 TABLET BY MOUTH bedtime  
     
   
   
   
  
 clobetasol 0.05 % ointment Commonly known as:  Marthenia Leanne Your last dose was: Your next dose is:    
   
   
 Apply  to affected area. diclofenac EC 75 mg EC tablet Commonly known as:  VOLTAREN Your last dose was: Your next dose is: Take  by mouth two (2) times daily as needed. ENBREL 50 mg/mL (0.98 mL) injection Generic drug:  etanercept Your last dose was: Your next dose is:    
   
   
 by SubCUTAneous route Every Thursday. hydrOXYzine HCl 10 mg tablet Commonly known as:  ATARAX Your last dose was: Your next dose is: Take 10 mg by mouth. Indications: psoriasis 10 mg  
    
   
   
   
  
 metFORMIN 500 mg tablet Commonly known as:  GLUCOPHAGE Your last dose was: Your next dose is: Take 500 mg by mouth nightly. Indications: type 2 diabetes mellitus 500 mg  
    
   
   
   
  
 oxyCODONE-acetaminophen 5-325 mg per tablet Commonly known as:  PERCOCET Your last dose was: Your next dose is: Take 1 Tab by mouth every four (4) hours as needed. Max Daily Amount: 6 Tabs. 1 Tab PROBIOTIC 4X PO Your last dose was: Your next dose is: Take 1 Tab by mouth. 1 Tab  
    
   
   
   
  
 traZODone 150 mg tablet Commonly known as:  [place] Your last dose was: Your next dose is:    
   
   
 1 po qhs  
     
   
   
   
  
 urea 40 % topical cream  
Commonly known as:  CARMOL Your last dose was: Your next dose is:    
   
   
 NAY MARBLE SIZED AMOUNT TO THICKENED AREAS ON BODY ONCE TO BID PRN  
     
   
   
   
  
 XANAX 2 mg tablet Generic drug:  ALPRAZolam  
   
Your last dose was: Your next dose is:    
   
   
 1 tablet by mouth every 8 hours as needed for severe anxiety Opioid Education Prescription Opioids: What You Need to Know: 
 
Prescription opioids can be used to help relieve moderate-to-severe pain and are often prescribed following a surgery or injury, or for certain health conditions. These medications can be an important part of treatment but also come with serious risks. Opioids are strong pain medicines. Examples include hydrocodone, oxycodone, fentanyl, and morphine. Heroin is an example of an illegal opioid. It is important to work with your health care provider to make sure you are getting the safest, most effective care. WHAT ARE THE RISKS AND SIDE EFFECTS OF OPIOID USE? Prescription opioids carry serious risks of addiction and overdose, especially with prolonged use. An opioid overdose, often marked by slow breathing, can cause sudden death. The use of prescription opioids can have a number of side effects as well, even when taken as directed. · Tolerance-meaning you might need to take more of a medication for the same pain relief · Physical dependence-meaning you have symptoms of withdrawal when the medication is stopped. Withdrawal symptoms can include nausea, sweating, chills, diarrhea, stomach cramps, and muscle aches. Withdrawal can last up to several weeks, depending on which drug you took and how long you took it. · Increased sensitivity to pain · Constipation · Nausea, vomiting, and dry mouth · Sleepiness and dizziness · Confusion · Depression · Low levels of testosterone that can result in lower sex drive, energy, and strength · Itching and sweating RISKS ARE GREATER WITH:      
· History of drug misuse, substance use disorder, or overdose · Mental health conditions (such as depression or anxiety) · Sleep apnea · Older age (72 years or older) · Pregnancy Avoid alcohol while taking prescription opioids. Also, unless specifically advised by your health care provider, medications to avoid include: · Benzodiazepines (such as Xanax or Valium) · Muscle relaxants (such as Soma or Flexeril) · Hypnotics (such as Ambien or Lunesta) · Other prescription opioids KNOW YOUR OPTIONS Talk to your health care provider about ways to manage your pain that don't involve prescription opioids. Some of these options may actually work better and have fewer risks and side effects. Options may include: 
· Pain relievers such as acetaminophen, ibuprofen, and naproxen · Some medications that are also used for depression or seizures · Physical therapy and exercise · Counseling to help patients learn how to cope better with triggers of pain and stress. · Application of heat or cold compress · Massage therapy · Relaxation techniques Be Informed Make sure you know the name of your medication, how much and how often to take it, and its potential risks & side effects.  
 
IF YOU ARE PRESCRIBED OPIOIDS FOR PAIN: 
 · Never take opioids in greater amounts or more often than prescribed. Remember the goal is not to be pain-free but to manage your pain at a tolerable level. · Follow up with your primary care provider to: · Work together to create a plan on how to manage your pain. · Talk about ways to help manage your pain that don't involve prescription opioids. · Talk about any and all concerns and side effects. · Help prevent misuse and abuse. · Never sell or share prescription opioids · Help prevent misuse and abuse. · Store prescription opioids in a secure place and out of reach of others (this may include visitors, children, friends, and family). · Safely dispose of unused/unwanted prescription opioids: Find your community drug take-back program or your pharmacy mail-back program, or flush them down the toilet, following guidance from the Food and Drug Administration (www.fda.gov/Drugs/ResourcesForYou). · Visit www.cdc.gov/drugoverdose to learn about the risks of opioid abuse and overdose. · If you believe you may be struggling with addiction, tell your health care provider and ask for guidance or call [address] [place], [place] [address] at [phone]. Discharge Instructions DISCHARGE SUMMARY from Nurse PATIENT INSTRUCTIONS: 
 
 
F-face looks uneven A-arms unable to move or move unevenly S-speech slurred or non-existent T-time-call 911 as soon as signs and symptoms begin-DO NOT go Back to bed or wait to see if you get better-TIME IS BRAIN. Warning Signs of HEART ATTACK Call 911 if you have these symptoms: 
? Chest discomfort.  Most heart attacks involve discomfort in the center of the chest that lasts more than a few minutes, or that goes away and comes back. It can feel like uncomfortable pressure, squeezing, fullness, or pain. ? Discomfort in other areas of the upper body. Symptoms can include pain or discomfort in one or both arms, the back, neck, jaw, or stomach. ? Shortness of breath with or without chest discomfort. ? Other signs may include breaking out in a cold sweat, nausea, or lightheadedness. Don't wait more than five minutes to call [address]! Fast action can save your life. Calling 911 is almost always the fastest way to get lifesaving treatment. Emergency Medical Services staff can begin treatment when they arrive  up to an hour sooner than if someone gets to the hospital by car. The discharge information has been reviewed with the patient and spouse. The patient and spouse verbalized understanding. Discharge medications reviewed with the patient and spouse and appropriate educational materials and side effects teaching were provided. ___________________________________________________________________________________________________________________________________ Iona Saunders, Signature                                                                           7/31/2018 Valentín Moon RN Percutaneous Lung Biopsy: What to Expect at [facility] Your Recovery A percutaneous (say \"per-kew-SELWYN-nee-us) lung biopsy is a procedure to take a sample (biopsy) of lung tissue. The doctor puts a long needle through your chest wall to get the sample. Another doctor will look at the lung tissue with a microscope to check for infection, cancer, or other lung problems. This procedure is also called a needle biopsy. You may be sore where the doctor made the cut (incision) in your skin and put in the biopsy needle. You may feel some pain in your lung when you take a deep breath. These symptoms usually get better in a few days.  If you cough up mucus, there may be streaks of blood in the mucus for the first week after the procedure. You may need to take it easy at home for a day or two after the procedure. For 1 week, try to avoid heavy lifting and strenuous activities. These activities could cause bleeding from the biopsy site. It can take several days to get the results of the biopsy. The doctor or nurse will discuss the results with you. This care sheet gives you a general idea about how long it will take for you to recover. But each person recovers at a different pace. Follow the steps below to feel better as quickly as possible. How can you care for yourself at home? Activity 
  · Rest when you feel tired. Getting enough sleep will help you recover.  
  · Try to walk each day. Start by walking a little more than you did the day before. Bit by bit, increase the amount you walk. Walking boosts blood flow and helps prevent pneumonia and constipation.  
  · Avoid strenuous activities, such as bicycle riding, jogging, weight lifting, or aerobic exercise, for 1 week or until your doctor says it is okay.  
  · For 1 week, avoid lifting anything that would make you strain. This may include a child, heavy grocery bags and milk containers, a heavy briefcase or backpack, cat litter or dog food bags, or a vacuum .  
  · Ask your doctor when you can drive again.  
  · You may need to take 1 or 2 days off from work. It depends on the type of work you do and how you feel.  
  · You may shower 1 or 2 days after the procedure, if your doctor says it is okay. Pat the incision dry. Do not take a bath for the first week, or until your doctor tells you it is okay.  
  · Do not fly in an airplane or dive deeply (such as in scuba diving) until your doctor tells you it is okay. Avoid any situations where there is increased air pressure. Diet 
  · You can eat your normal diet. If your stomach is upset, try bland, low-fat foods like plain rice, broiled chicken, toast, and yogurt. Medicines   · Your doctor will tell you if and when you can restart your medicines. He or she will also give you instructions about taking any new medicines.  
  · If you take blood thinners, such as warfarin (Coumadin), clopidogrel (Plavix), or aspirin, be sure to talk to your doctor. He or she will tell you if and when to start taking those medicines again. Make sure that you understand exactly what your doctor wants you to do.  
  · Be safe with medicines. Take pain medicines exactly as directed. ¨ If the doctor gave you a prescription medicine for pain, take it as prescribed. ¨ If you are not taking a prescription pain medicine, ask your doctor if you can take an over-the-counter medicine.  
  · If you think your pain medicine is making you sick to your stomach: 
¨ Take your medicine after meals (unless your doctor has told you not to). ¨ Ask your doctor for a different pain medicine.  
  · If your doctor prescribed antibiotics, take them as directed. Do not stop taking them just because you feel better. You need to take the full course of antibiotics. Incision care 
  · If you have strips of tape on the incision, leave the tape on for a week or until it falls off.  
  · Wash the area daily with warm, soapy water and pat it dry. Don't use hydrogen peroxide or alcohol, which can slow healing. You may cover the area with a gauze bandage if it weeps or rubs against clothing. Change the bandage every day.  
  · Keep the area clean and dry. Follow-up care is a key part of your treatment and safety. Be sure to make and go to all appointments, and call your doctor if you are having problems. It's also a good idea to know your test results and keep a list of the medicines you take. When should you call for help? Call 911 anytime you think you may need emergency care. For example, call if: 
  · You passed out (lost consciousness).  
  · You have severe trouble breathing.   · You have sudden chest pain and shortness of breath, or you cough up blood.  
 Call your doctor now or seek immediate medical care if: 
  · You are sick to your stomach or cannot keep fluids down.  
  · You have pain that does not get better after you take pain medicine.  
  · You have a fever over 100°F.  
  · You have signs of infection, such as: 
¨ Increased pain, swelling, warmth, or redness. ¨ Red streaks leading from the incision. ¨ Pus draining from the incision. ¨ Swollen lymph nodes in your neck, armpits, or groin. ¨ A fever.  
  · Bright red blood has soaked through the bandage over your incision.  
  · You cough up a lot more mucus than normal, or the mucus changes color.  
 Watch closely for changes in your health, and be sure to contact your doctor if you have any problems. Where can you learn more? Go to http://sujit-colleen.info/. Enter D092 in the search box to learn more about \"Percutaneous Lung Biopsy: What to Expect at Home. \" Current as of: December 6, 2017 Content Version: 11.7 © 7049-5081 [employer]. Care instructions adapted under license by [employer] (which disclaims liability or warranty for this information). If you have questions about a medical condition or this instruction, always ask your healthcare professional. [address] any warranty or liability for your use of this information. Patient armband removed and shredded Introducing [facility]! Dear Moses eHss: 
Thank you for requesting a About[employer]Work account. Our records indicate that you already have an active [employer] account. You can access your account anytime at [email or web address] Did you know that you can access your hospital and ER discharge instructions at any time in About[employer]Work? You can also review all of your test results from your hospital stay or ER visit. Additional Information If you have questions, please visit the Frequently Asked Questions section of the MyChart website at [email or web address] Remember, [employer] is NOT to be used for urgent needs. For medical emergencies, dial 911. Now available from your iPhone and Android! Introducing Carlin Radford As a [employer] patient, I wanted to make you aware of our electronic visit tool called Carlin Radford. [employer] 24/7 allows you to connect within minutes with a medical provider 24 hours a day, seven days a week via a mobile device or tablet or logging into a secure website from your computer. You can access Carlin Radford from anywhere in the [place]. A virtual visit might be right for you when you have a simple condition and feel like you just dont want to get out of bed, or cant get away from work for an appointment, when your regular [employer] provider is not available (evenings, weekends or holidays), or when youre out of town and need minor care. Electronic visits cost only $49 and if the [employer] 24/7 provider determines a prescription is needed to treat your condition, one can be electronically transmitted to a nearby pharmacy*. Please take a moment to enroll today if you have not already done so. The enrollment process is free and takes just a few minutes. To enroll, please download the [employer] 24/7 tariq to your tablet or phone, or visit [email or web address] to enroll on your computer. And, as an [address] [place] [address] patient with a [employer] account, the results of your visits will be scanned into your electronic medical record and your primary care provider will be able to view the scanned results. We urge you to continue to see your regular [employer] provider for your ongoing medical care.   And while your primary care provider may not be the one available when you seek a Carlin Radford virtual visit, the peace of mind you get from getting a real diagnosis real time can be priceless. For more information on Carlin Radford, view our Frequently Asked Questions (FAQs) at [email or web address]. Sincerely, 
 
Pj Patel MD 
Chief Medical Officer [address] *:  certain medications cannot be prescribed via Carlin Radford Providers Seen During Your Hospitalization Provider Specialty Primary office phone Pelon Keane MD Radiology [phone] Your Primary Care Physician (PCP) Primary Care Physician Office Phone Office Fax OTHER, PHYS ** None ** ** None ** You are allergic to the following Allergen Reactions Bee Venom Protein (Honey Bee) Anaphylaxis Lemon Hives  
 fresh Vicodin (Hydrocodone-Acetaminophen) Hives Itching Swelling Recent Documentation Height Weight BMI OB Status Smoking Status 1.727 m 79.2 kg 26.56 kg/m2 Hysterectomy Former Smoker Emergency Contacts Name Discharge Info Relation Home Work Mobile [address] [facility] CAREGIVER [3] Spouse [3] [phone] Patient Belongings The following personal items are in your possession at time of discharge: 
                             
 
  
  
 Please provide this summary of care documentation to your next provider. Signatures-by signing, you are acknowledging that this After Visit Summary has been reviewed with you and you have received a copy. Patient Signature:  ____________________________________________________________ Date:  ____________________________________________________________  
  
Radha Moreno Provider Signature:  ____________________________________________________________ Date:  ____________________________________________________________

## 2018-08-01 ENCOUNTER — PATIENT MESSAGE (OUTPATIENT)
Dept: ONCOLOGY | Age: 58
End: 2018-08-01

## 2018-08-01 NOTE — TELEPHONE ENCOUNTER
Returned call to patient, may resume Enbrel. Will schedule office f/u with Dr. Sen Hendricks to review results and further discuss treatment plan. Will call with pathology results if available sooner. Patient agreeable.

## 2018-08-02 ENCOUNTER — TELEPHONE (OUTPATIENT)
Dept: ONCOLOGY | Age: 58
End: 2018-08-02

## 2018-08-02 NOTE — TELEPHONE ENCOUNTER
Contacted [facility] to schedule pt with oncology for Lung CA dx from biopsy on 07/31/2018. Appointment made for Monday 8/13/2018 at 11 am with an arrival time of 10:30am at [facility] location. Client insurance information given to . Informed client of appointment time and location. Patient verbalized understanding. Patient agreed to plan. Patient instructed to contact office for any question or concerns.      Filiberto Granados NP

## 2018-08-02 NOTE — TELEPHONE ENCOUNTER
Patient called wanting the name and full detail of her cancer diagnosis. After speaking with NP-Mrs. Nakita Plata I imformed patient that Mrs. Sutton-NP would get back with her with a more detailed description of her diagnosis.

## 2018-08-12 NOTE — OP NOTES
OPERATIVE NOTE    Date of Procedure: 7/13/2018     Preoperative Diagnosis: CERVICAL STUMP PROLAPSE,VAGINAL ENTEROCELE,CYSTOCELE    Postoperative Diagnosis: CERVICAL STUMP PROLAPSE,VAGINAL ENTEROCELE,CYSTOCELE, pelvic mass      Procedure(s):  TRACHELECTOMY,ANTERIOR AND POSTERIOR REPAIR,SACROSPINOUS LIGAMENT SUSPENSION,CYSTOSCOPY **SPEC POP**, Examination under anesthesia    Surgeon(s) and Role:     * Tabatha Ashley MD - Primary         Surgical Assistant: Michelle Wang CST    Surgical Staff:  Circ-1: Graciela Thomas  Circ-Relief: Laura Ramirez RN; Demetris Banks RN  Surg Asst-1: Leslie Perkins    Event Time In   Incision Start 1211   Incision Close 1347     Anesthesia: General     Estimated Blood Loss: 100 ml    Specimens:   ID Type Source Tests Collected by Time Destination   1 : CERVIX Preservative Cervix  Tabatha Ashley MD 7/13/2018 1258 Pathology      Findings: Examination under anesthesia revealed a well-circumscribed, smooth pelvic mass, 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 Pt had known pelvic relaxation with prolapse of the cervix to the introitus, anterior defect and posterior compartment defect. The uterus was surgically absent (supracervical hysterectomy)     Complications: none    Implants:     Implant Name Type Inv. Item Serial No.  Lot No. LRB No. Used Action   ANCHORSURE       [employer] [id number] N/A 1 Implanted     Disposition:  Stable to recovery    Description of procedure: After insuring informed consent, the patient was taken to the operating room and placed on the operating table in dorsal supine position. General anesthesia was administered, and when found to be adequate, the patient was repositioned in dorsal lithotomy with her feet in [address] [place] [address]. Time out was performed per facility protocol and the procedure re-verified and the patient identity re-confirmed with the operating room and anesthesia staff.  The patient was then prepped and draped in the usual sterile fashion and guaman catheter was placed transurethrally. The prolapsed cervical stump was then grasped with a Hosea's tenaculum and circumferentially injected with a dilute solution of bupivacaine 0.25% with epinephrine. A circumferential incision was then made with the scalpel. The uterosacral ligaments were then clamped with Ilene clamps, divided and secured with sutures of 2-0 vicryl that were left long and tagged. The peritoneum was pushed upwards both anteriorly and posteriorly until the cephalad end of the cervical stump was reached. The remaining attachments of the cervix were taken down with the Ethicon bipolar device, freeing the cervix. Once the cervix was removed, the vaginal cuff was reapproximated with several figure of 8 stitches of 0 vicryl. At this point, the vaginal apex was evaluated and a mass was appreciated projecting from the abdomen into the lower pelvis. This appeared to be rather large, but smooth and well circumscribed. At this point, the decision was made to complete the procedure and have the patient undergo further evaluation postoperatively. The posterior vaginal mucosa was then opened in the midline via a vertical incision. The mucosa was reflected laterally and the endopelvic fascia exposed. Lateral and inferior dissection was undertaken to the left to reach the ischial spine and sacrospinous ligament. An area approx 2 finger-breadths medial to the left ischial spine was cleared. Next, the Eddy Labso-sure device was used to place 2 sutures into the ligament and then the distal ends were secured to the left vaginal apex, creating a pulley stitch. These were left long and tagged with hemostats. Next, the endopelvic fascia of the posterior vaginal compartment was imbricated using interrupted 2-0 vicryl suture. The excess vaginal mucosa was trimmed and reapproximated partially. The sacrospinous stitches were then tied down suspending the vaginal apex and repairing the enterocele. The remaining vaginal mucosa was then reapproximated using a running interlocking stitch of 2-0 vicryl. Excellent hemostasis was noted. Cystourethroscopy was performed using a 17F cystoscope with 70 degree lens. Both UO's were identified with brisk efflux of urine. The bladder mucosa, trigone and urethra were normal in appearance, with the exception of mass effect on the right of the bladder from what appeared to be external compression. The cystoscope was removed and the Osborne replaced. A vaginal packing was placed. The patient tolerated the procedure well, was awakened, extubated and taken to the recovery room in stable condition. All sponge lap needle and instrument counts were correct x 2 per the circulating and scrub nurses.      Eddie Jones MD 3:30 AM 8/12/2018     Eddie Jones MD 2:15 PM 8/12/2018

## 2018-08-12 NOTE — DISCHARGE SUMMARY
Discharge Summary     Name: Cj Villafana MRN: [id number]  SSN: [id number]    YOB: 1960  Age: 59 y.o. Sex: female      Allergies: Bee venom protein (honey bee); Lemon; and Vicodin [hydrocodone-acetaminophen]    Admit Date: 7/13/2018    Discharge Date: 07/14/2018     Admitting Physician: Wilmar Guidry MD     * Admission Diagnoses: Pelvic relaxation    * Discharge Diagnoses:   Hospital Problems as of 7/14/2018  Date Reviewed: 7/13/2018          Codes Class Noted - Resolved POA    Pelvic relaxation due to cervical stump prolpase ICD-10-CM: N81.85  ICD-9-CM: 618.84  7/13/2018 - Present Unknown               * Procedures: exam under anesthesia, trachelectomy, vaginal repair of enterocele, posterior colporraphy, extraperitoneal colposuspension (SSLS), cystoscopy    * Discharge Condition: [facility] Course: Normal hospital course for this procedure. A large pelvic mass was identified and CT was worrisome for metastatic disease. Follow was scheduled with Gyn oncology and telephone consultation was obtained. Significant Diagnostic Studies: No results found for this or any previous visit (from the past 24 hour(s)). * Disposition: Home    Discharge Medications:   Discharge Medication List as of 7/14/2018  2:13 PM      START taking these medications    Details   oxyCODONE-acetaminophen (PERCOCET) 5-325 mg per tablet Take 1 Tab by mouth every four (4) hours as needed. Max Daily Amount: 6 Tabs., Print, Disp-20 Tab, R-0         CONTINUE these medications which have NOT CHANGED    Details   ALPRAZolam (XANAX) 2 mg tablet 1 tablet by mouth every 8 hours as needed for severe anxiety, Historical Med      atorvastatin (LIPITOR) 20 mg tablet TAKE 1 TABLET BY MOUTH bedtime, Historical Med      clobetasol (TEMOVATE) 0.05 % ointment Apply  to affected area., Historical Med      hydrOXYzine HCl (ATARAX) 10 mg tablet Take 10 mg by mouth.  Indications: psoriasis, Historical Med      metFORMIN (GLUCOPHAGE) 500 mg tablet Take 500 mg by mouth nightly. Indications: type 2 diabetes mellitus, Historical Med      B infantis/B ani/B cristine/B bifid (PROBIOTIC 4X PO) Take 1 Tab by mouth., Historical Med      traZODone (DESYREL) 150 mg tablet 1 po qhs, Historical Med      urea (CARMOL) 40 % topical cream NAY MARBLE SIZED AMOUNT TO THICKENED AREAS ON BODY ONCE TO BID PRN, Historical Med      diclofenac EC (VOLTAREN) 75 mg EC tablet Take  by mouth two (2) times daily as needed., Historical Med      etanercept (ENBREL) 50 mg/mL (0.98 mL) injection by SubCUTAneous route Every Thursday., Historical Med              * Follow-up Care/Patient Instructions: Activity: No sex, douching, or tampons for 6 weeks or as directed by your physician. No heavy lifting for 6 weeks. No driving while taking pain medication.   Diet: Resume pre-hospital diet  Wound Care: As directed    Follow-up Information     Follow up With Details Comments Contact Info    Phys Other, MD   Patient can only remember the practice name and not the physician             Signed By:  John Graham MD     August 12, 2018

## 2018-08-22 ENCOUNTER — HOSPITAL ENCOUNTER (OUTPATIENT)
Dept: CT IMAGING | Age: 58
Discharge: HOME | End: 2018-08-22
Attending: RADIOLOGY | Admitting: RADIOLOGY
Payer: COMMERCIAL

## 2018-08-22 VITALS
DIASTOLIC BLOOD PRESSURE: 63 MMHG | SYSTOLIC BLOOD PRESSURE: 111 MMHG | TEMPERATURE: 98.3 F | OXYGEN SATURATION: 99 % | HEART RATE: 83 BPM | RESPIRATION RATE: 20 BRPM

## 2018-08-22 DIAGNOSIS — R19.09 ABDOMINAL MASS OF OTHER SITE: ICD-10-CM

## 2018-08-22 LAB
GLUCOSE BLD STRIP.AUTO-MCNC: 120 MG/DL (ref 70–110)
INR PPP: 0.8 (ref 0.8–1.2)
PLATELET # BLD AUTO: 301 K/UL (ref 135–420)
PROTHROMBIN TIME: 11 SEC (ref 11.5–15.2)

## 2018-08-22 PROCEDURE — 99152 MOD SED SAME PHYS/QHP 5/>YRS: CPT

## 2018-08-22 PROCEDURE — 85049 AUTOMATED PLATELET COUNT: CPT | Performed by: INTERNAL MEDICINE

## 2018-08-22 PROCEDURE — 88305 TISSUE EXAM BY PATHOLOGIST: CPT | Performed by: RADIOLOGY

## 2018-08-22 PROCEDURE — 82962 GLUCOSE BLOOD TEST: CPT

## 2018-08-22 PROCEDURE — 74011250636 HC RX REV CODE- 250/636: Performed by: RADIOLOGY

## 2018-08-22 PROCEDURE — 85610 PROTHROMBIN TIME: CPT | Performed by: INTERNAL MEDICINE

## 2018-08-22 PROCEDURE — 20206 BIOPSY MUSCLE PERQ NEEDLE: CPT

## 2018-08-22 RX ORDER — OXYCODONE AND ACETAMINOPHEN 10; 325 MG/1; MG/1
1 TABLET ORAL
Status: DISCONTINUED | OUTPATIENT
Start: 2018-08-22 | End: 2018-08-22 | Stop reason: HOSPADM

## 2018-08-22 RX ORDER — LIDOCAINE HYDROCHLORIDE 10 MG/ML
1-20 INJECTION INFILTRATION; PERINEURAL
Status: DISCONTINUED | OUTPATIENT
Start: 2018-08-22 | End: 2018-08-22 | Stop reason: HOSPADM

## 2018-08-22 RX ORDER — ACETAMINOPHEN 325 MG/1
650 TABLET ORAL
Status: DISCONTINUED | OUTPATIENT
Start: 2018-08-22 | End: 2018-08-22 | Stop reason: HOSPADM

## 2018-08-22 RX ORDER — FENTANYL CITRATE 50 UG/ML
25-200 INJECTION, SOLUTION INTRAMUSCULAR; INTRAVENOUS
Status: DISCONTINUED | OUTPATIENT
Start: 2018-08-22 | End: 2018-08-22 | Stop reason: HOSPADM

## 2018-08-22 RX ORDER — ONDANSETRON 2 MG/ML
4 INJECTION INTRAMUSCULAR; INTRAVENOUS
Status: DISCONTINUED | OUTPATIENT
Start: 2018-08-22 | End: 2018-08-22 | Stop reason: HOSPADM

## 2018-08-22 RX ORDER — NALOXONE HYDROCHLORIDE 0.4 MG/ML
0.1 INJECTION, SOLUTION INTRAMUSCULAR; INTRAVENOUS; SUBCUTANEOUS AS NEEDED
Status: DISCONTINUED | OUTPATIENT
Start: 2018-08-22 | End: 2018-08-22 | Stop reason: HOSPADM

## 2018-08-22 RX ORDER — MIDAZOLAM HYDROCHLORIDE 1 MG/ML
.5-4 INJECTION, SOLUTION INTRAMUSCULAR; INTRAVENOUS
Status: DISCONTINUED | OUTPATIENT
Start: 2018-08-22 | End: 2018-08-22 | Stop reason: HOSPADM

## 2018-08-22 RX ORDER — SODIUM CHLORIDE 9 MG/ML
25 INJECTION, SOLUTION INTRAVENOUS CONTINUOUS
Status: DISCONTINUED | OUTPATIENT
Start: 2018-08-22 | End: 2018-08-22 | Stop reason: HOSPADM

## 2018-08-22 RX ORDER — FLUMAZENIL 0.1 MG/ML
0.2 INJECTION INTRAVENOUS
Status: DISCONTINUED | OUTPATIENT
Start: 2018-08-22 | End: 2018-08-22 | Stop reason: HOSPADM

## 2018-08-22 RX ADMIN — SODIUM CHLORIDE 25 ML/HR: 900 INJECTION, SOLUTION INTRAVENOUS at 08:30

## 2018-08-22 RX ADMIN — MIDAZOLAM HYDROCHLORIDE 0.5 MG: 1 INJECTION, SOLUTION INTRAMUSCULAR; INTRAVENOUS at 10:07

## 2018-08-22 RX ADMIN — LIDOCAINE HYDROCHLORIDE 9 ML: 10 INJECTION, SOLUTION INFILTRATION; PERINEURAL at 09:45

## 2018-08-22 RX ADMIN — FENTANYL CITRATE 50 MCG: 50 INJECTION, SOLUTION INTRAMUSCULAR; INTRAVENOUS at 09:54

## 2018-08-22 RX ADMIN — MIDAZOLAM HYDROCHLORIDE 0.5 MG: 1 INJECTION, SOLUTION INTRAMUSCULAR; INTRAVENOUS at 10:00

## 2018-08-22 RX ADMIN — MIDAZOLAM HYDROCHLORIDE 1 MG: 1 INJECTION, SOLUTION INTRAMUSCULAR; INTRAVENOUS at 09:54

## 2018-08-22 RX ADMIN — FENTANYL CITRATE 25 MCG: 50 INJECTION, SOLUTION INTRAMUSCULAR; INTRAVENOUS at 10:00

## 2018-08-22 NOTE — PROGRESS NOTES
TRANSFER - OUT REPORT:    Verbal report given to WOLF Jones(name) on Ryanne Ulloa  being transferred to Care Unit(unit) for routine progression of care       Report consisted of patients Situation, Background, Assessment and   Recommendations(SBAR). Information from the following report(s) SBAR, Kardex and MAR was reviewed with the receiving nurse. Lines:   Peripheral IV 08/22/18 Left Antecubital (Active)   Site Assessment Clean, dry, & intact 8/22/2018  8:31 AM   Phlebitis Assessment 0 8/22/2018  8:31 AM   Infiltration Assessment 0 8/22/2018  8:31 AM   Dressing Status Clean, dry, & intact 8/22/2018  8:31 AM   Dressing Type Tape;Transparent 8/22/2018  8:31 AM   Hub Color/Line Status Blue 8/22/2018  8:31 AM   Action Taken Open ports on tubing capped 8/22/2018  8:31 AM   Alcohol Cap Used Yes 8/22/2018  8:31 AM        Opportunity for questions and clarification was provided.       Patient transported with:   Registered Nurse

## 2018-08-22 NOTE — H&P
..      The patient is an appropriate candidate to undergo CT Guided pelvic mass biopsy     Patient assessed immediately prior to induction. Anesthesia plan as follows: Moderate     Planned agent(s):  Versed, Fentanyl    History and Physical update:  H&P was reviewed and the patient was evaluated. No changes have occurred in the patient's condition since the H&P was completed.     Mirlande Durán MD  Vascular & Interventional Radiology  [address] [place] [address] [facility]  8/22/2018

## 2018-08-22 NOTE — PROGRESS NOTES
Pt had an uneventful recovery. No c/o pain voiced. Discharge instructions reviewed and pt/spouse verbalized all understandings. Pt escorted to car and left in stable condition.

## 2018-08-22 NOTE — IP AVS SNAPSHOT
[address] [place] [id number] 
[phone] Patient: Roberto Gusman MRN: [id number] CQI:[id number] About your hospitalization You were admitted on:  August 22, 2018 You last received care in the:  [address] You were discharged on:  August 22, 2018 Why you were hospitalized Your primary diagnosis was:  Not on File Follow-up Information Follow up With Details Comments Contact Info Abraham Stephenson, MD   Patient can only remember the practice name and not the physician Discharge Orders None A check huong indicates which time of day the medication should be taken. My Medications ASK your doctor about these medications Instructions Each Dose to Equal  
 Morning Noon Evening Bedtime  
 atorvastatin 20 mg tablet Commonly known as:  LIPITOR Your last dose was: Your next dose is: TAKE 1 TABLET BY MOUTH bedtime  
     
   
   
   
  
 clobetasol 0.05 % ointment Commonly known as:  Kendra Jack Your last dose was: Your next dose is:    
   
   
 Apply  to affected area. diclofenac EC 75 mg EC tablet Commonly known as:  VOLTAREN Your last dose was: Your next dose is: Take  by mouth two (2) times daily as needed. ENBREL 50 mg/mL (0.98 mL) injection Generic drug:  etanercept Your last dose was: Your next dose is:    
   
   
 by SubCUTAneous route Every Thursday. hydrOXYzine HCl 10 mg tablet Commonly known as:  ATARAX Your last dose was: Your next dose is: Take 10 mg by mouth. Indications: psoriasis 10 mg  
    
   
   
   
  
 metFORMIN 500 mg tablet Commonly known as:  GLUCOPHAGE Your last dose was: Your next dose is: Take 500 mg by mouth nightly. Indications: type 2 diabetes mellitus 500 mg  
    
   
   
   
  
 oxyCODONE-acetaminophen 5-325 mg per tablet Commonly known as:  PERCOCET Your last dose was: Your next dose is: Take 1 Tab by mouth every four (4) hours as needed. Max Daily Amount: 6 Tabs. 1 Tab PROBIOTIC 4X PO Your last dose was: Your next dose is: Take 1 Tab by mouth. 1 Tab  
    
   
   
   
  
 traZODone 150 mg tablet Commonly known as:  Padilla [employer]ian Your last dose was: Your next dose is:    
   
   
 1 po qhs  
     
   
   
   
  
 urea 40 % topical cream  
Commonly known as:  CARMOL Your last dose was: Your next dose is:    
   
   
 NAY MARBLE SIZED AMOUNT TO THICKENED AREAS ON BODY ONCE TO BID PRN  
     
   
   
   
  
 XANAX 2 mg tablet Generic drug:  ALPRAZolam  
   
Your last dose was: Your next dose is:    
   
   
 1 tablet by mouth every 8 hours as needed for severe anxiety Opioid Education Prescription Opioids: What You Need to Know: 
 
Prescription opioids can be used to help relieve moderate-to-severe pain and are often prescribed following a surgery or injury, or for certain health conditions. These medications can be an important part of treatment but also come with serious risks. Opioids are strong pain medicines. Examples include hydrocodone, oxycodone, fentanyl, and morphine. Heroin is an example of an illegal opioid. It is important to work with your health care provider to make sure you are getting the safest, most effective care. WHAT ARE THE RISKS AND SIDE EFFECTS OF OPIOID USE? Prescription opioids carry serious risks of addiction and overdose, especially with prolonged use. An opioid overdose, often marked by slow breathing, can cause sudden death. The use of prescription opioids can have a number of side effects as well, even when taken as directed. · Tolerance-meaning you might need to take more of a medication for the same pain relief · Physical dependence-meaning you have symptoms of withdrawal when the medication is stopped. Withdrawal symptoms can include nausea, sweating, chills, diarrhea, stomach cramps, and muscle aches. Withdrawal can last up to several weeks, depending on which drug you took and how long you took it. · Increased sensitivity to pain · Constipation · Nausea, vomiting, and dry mouth · Sleepiness and dizziness · Confusion · Depression · Low levels of testosterone that can result in lower sex drive, energy, and strength · Itching and sweating RISKS ARE GREATER WITH:      
· History of drug misuse, substance use disorder, or overdose · Mental health conditions (such as depression or anxiety) · Sleep apnea · Older age (72 years or older) · Pregnancy Avoid alcohol while taking prescription opioids. Also, unless specifically advised by your health care provider, medications to avoid include: · Benzodiazepines (such as Xanax or Valium) · Muscle relaxants (such as Soma or Flexeril) · Hypnotics (such as Ambien or Lunesta) · Other prescription opioids KNOW YOUR OPTIONS Talk to your health care provider about ways to manage your pain that don't involve prescription opioids. Some of these options may actually work better and have fewer risks and side effects. Options may include: 
· Pain relievers such as acetaminophen, ibuprofen, and naproxen · Some medications that are also used for depression or seizures · Physical therapy and exercise · Counseling to help patients learn how to cope better with triggers of pain and stress. · Application of heat or cold compress · Massage therapy · Relaxation techniques Be Informed Make sure you know the name of your medication, how much and how often to take it, and its potential risks & side effects.  
 
IF YOU ARE PRESCRIBED OPIOIDS FOR PAIN: 
 · Never take opioids in greater amounts or more often than prescribed. Remember the goal is not to be pain-free but to manage your pain at a tolerable level. · Follow up with your primary care provider to: · Work together to create a plan on how to manage your pain. · Talk about ways to help manage your pain that don't involve prescription opioids. · Talk about any and all concerns and side effects. · Help prevent misuse and abuse. · Never sell or share prescription opioids · Help prevent misuse and abuse. · Store prescription opioids in a secure place and out of reach of others (this may include visitors, children, friends, and family). · Safely dispose of unused/unwanted prescription opioids: Find your community drug take-back program or your pharmacy mail-back program, or flush them down the toilet, following guidance from the Food and Drug Administration (www.fda.gov/Drugs/ResourcesForYou). · Visit www.cdc.gov/drugoverdose to learn about the risks of opioid abuse and overdose. · If you believe you may be struggling with addiction, tell your health care provider and ask for guidance or call [address] [place] [address] at [phone]. Discharge Instructions [facility] General Instructions: A biopsy is the removal of a small piece of tissue for microscopic examination or testing. Healthy tissue can be obtained for the purpose of tissue-type matching for transplants. Unhealthy tissues are more commonly biopsied to diagnose disease. General Biopsy: 
   A mass can grow in any area of the body, and we are taking a specimen as ordered by your doctor. The risks are the same. They include bleeding, pain, and infection. Home Care Instructions: You may resume your regular diet and medication regimen.  Do not drink alcohol, drive, or make any important legal decisions in the next 24 hours. Do not lift anything heavier than a gallon of milk until the soreness goes away. You may use over the counter acetaminophen or ibuprofen for the soreness. You may apply an ice pack to the affected area for 20-30 minutes at time for the first 24 hours. After that, you may apply a heat pack. Call If: You should call your Physician and/or the Radiology Nurse if you have any questions or concerns about the biopsy site. Call if you should have increased pain, fever, redness, drainage, or bleeding more than a small spot on the bandage. Follow-Up Instructions: Please see your ordering doctor as he/she has requested. To Reach Us:   
 
 
Patient Signature: 
Date: 8/22/2018 Discharging Nurse: Miracle Marquez RN 
 
 
 
 
 
 
 
DISCHARGE SUMMARY from Nurse PATIENT INSTRUCTIONS: 
 
 
F-face looks uneven A-arms unable to move or move unevenly S-speech slurred or non-existent T-time-call 911 as soon as signs and symptoms begin-DO NOT go Back to bed or wait to see if you get better-TIME IS BRAIN. Warning Signs of HEART ATTACK Call 911 if you have these symptoms: 
? Chest discomfort. Most heart attacks involve discomfort in the center of the chest that lasts more than a few minutes, or that goes away and comes back. It can feel like uncomfortable pressure, squeezing, fullness, or pain. ? Discomfort in other areas of the upper body. Symptoms can include pain or discomfort in one or both arms, the back, neck, jaw, or stomach. ? Shortness of breath with or without chest discomfort. ? Other signs may include breaking out in a cold sweat, nausea, or lightheadedness. Don't wait more than five minutes to call [address]! Fast action can save your life.  Calling 911 is almost always the fastest way to get lifesaving treatment. Emergency Medical Services staff can begin treatment when they arrive  up to an hour sooner than if someone gets to the hospital by car. The discharge information has been reviewed with the patient and caregiver. The patient and caregiver verbalized understanding. Discharge medications reviewed with the patient and caregiver and appropriate educational materials and side effects teaching were provided. Patient armband removed and shredded 
 
___________________________________________________________________________________________________________________________________ Introducing [facility]! Dear Mk Anne: 
Thank you for requesting a [employer] account. Our records indicate that you already have an active [employer] account. You can access your account anytime at [email or web address] Did you know that you can access your hospital and ER discharge instructions at any time in [employer]? You can also review all of your test results from your hospital stay or ER visit. Additional Information If you have questions, please visit the Frequently Asked Questions section of the [employer] website at [email or web address] Remember, [employer] is NOT to be used for urgent needs. For medical emergencies, dial 911. Now available from your iPhone and Android! Introducing Carlin Radford As a [employer] patient, I wanted to make you aware of our electronic visit tool called Carlin Radford. [employer] 24/7 allows you to connect within minutes with a medical provider 24 hours a day, seven days a week via a mobile device or tablet or logging into a secure website from your computer. You can access Carlin Radford from anywhere in the [place].  
 
A virtual visit might be right for you when you have a simple condition and feel like you just dont want to get out of bed, or cant get away from work for an appointment, when your regular [employer] provider is not available (evenings, weekends or holidays), or when youre out of town and need minor care. Electronic visits cost only $49 and if the [employer] 24/7 provider determines a prescription is needed to treat your condition, one can be electronically transmitted to a nearby pharmacy*. Please take a moment to enroll today if you have not already done so. The enrollment process is free and takes just a few minutes. To enroll, please download the [employer] 24/7 tariq to your tablet or phone, or visit [email or web address] to enroll on your computer. And, as an [address] [place] [address] patient with a [employer] account, the results of your visits will be scanned into your electronic medical record and your primary care provider will be able to view the scanned results. We urge you to continue to see your regular [employer] provider for your ongoing medical care. And while your primary care provider may not be the one available when you seek a [employer] virtual visit, the peace of mind you get from getting a real diagnosis real time can be priceless. For more information on [employer], view our Frequently Asked Questions (FAQs) at [email or web address]. Sincerely, 
 
Gracie Trejo MD 
Chief Medical Officer [employer] *:  certain medications cannot be prescribed via [employer] Unresulted Labs-Please follow up with your PCP about these lab tests Order Current Status CT BX PELV DEEP In process Providers Seen During Your Hospitalization Provider Specialty Primary office phone Mirlande Durán MD Diagnostic Radiology [phone] Your Primary Care Physician (PCP) Primary Care Physician Office Phone Office Fax OTHER, PHYS ** None ** ** None ** You are allergic to the following Allergen Reactions Bee Venom Protein (Honey Bee) Anaphylaxis Lemon Hives  
 fresh Vicodin (Hydrocodone-Acetaminophen) Hives Itching Swelling Recent Documentation Breastfeeding? OB Status Smoking Status No Hysterectomy Former Smoker Emergency Contacts Name Discharge Info Relation Home Work Mobile [address] CAREGIVER [3] Spouse [3] [phone] Patient Belongings The following personal items are in your possession at time of discharge: 
     Visual Aid: Glasses Please provide this summary of care documentation to your next provider. Signatures-by signing, you are acknowledging that this After Visit Summary has been reviewed with you and you have received a copy. Patient Signature:  ____________________________________________________________ Date:  ____________________________________________________________  
  
[facility] Provider Signature:  ____________________________________________________________ Date:  ____________________________________________________________

## 2018-08-22 NOTE — IP AVS SNAPSHOT
Summary of Care Report The Summary of Care report has been created to help improve care coordination. Users with access to [email or web address] or [address] (Web-based application) may access additional patient information including the Discharge Summary. If you are not currently a [address] user and need more information, please call the number listed below in the [place] [address] section and ask to be connected with Medical Records. Facility Information Name Address Phone [address] [address] [phone] Patient Information Patient Name Sex  Gloria Tavera ([id number]) Female 1960 Discharge Information Admitting Provider Service Area Unit Sunitha Mills MD / [facility] / [phone] Discharge Provider Discharge Date/Time Discharge Disposition Destination (none) 2018 14:00 (Pending) AHR (none) Patient Language Language ENGLISH [13] Hospital Problems as of 2018  Reviewed: 2018  2:59 PM by Jaki Gandhi MD  
 None Non-Hospital Problems as of 2018  Reviewed: 2018  2:59 PM by Jaki Gandhi MD  
  
  
  
 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  
  Entered by Angelina Qeuen MD  
  Pulmonary nodules/lesions, multiple  2018 - Present 2018 by Juan Araujo NP Entered by Juan Araujo NP You are allergic to the following Allergen Reactions Bee Venom Protein (Honey Bee) Anaphylaxis Lemon Hives  
 fresh Vicodin (Hydrocodone-Acetaminophen) Hives Itching Swelling Current Discharge Medication List  
  
ASK your doctor about these medications Dose & Instructions Dispensing Information Comments  
 atorvastatin 20 mg tablet Commonly known as:  LIPITOR  
 TAKE 1 TABLET BY MOUTH bedtime Refills:  0  
   
 clobetasol 0.05 % ointment Commonly known as:  [place] Apply  to affected area. Refills:  0  
   
 diclofenac EC 75 mg EC tablet Commonly known as:  VOLTAREN Take  by mouth two (2) times daily as needed. Refills:  0  
   
 ENBREL 50 mg/mL (0.98 mL) injection Generic drug:  etanercept  
 by SubCUTAneous route Every Thursday. Refills:  0  
   
 hydrOXYzine HCl 10 mg tablet Commonly known as:  ATARAX Dose:  10 mg Take 10 mg by mouth. Indications: psoriasis Refills:  0  
   
 metFORMIN 500 mg tablet Commonly known as:  GLUCOPHAGE Dose:  500 mg Take 500 mg by mouth nightly. Indications: type 2 diabetes mellitus Refills:  0  
   
 oxyCODONE-acetaminophen 5-325 mg per tablet Commonly known as:  PERCOCET Dose:  1 Tab Take 1 Tab by mouth every four (4) hours as needed. Max Daily Amount: 6 Tabs. Quantity:  20 Tab Refills:  0 PROBIOTIC 4X PO Dose:  1 Tab Take 1 Tab by mouth. Refills:  0  
   
 traZODone 150 mg tablet Commonly known as:  Vernon Oh 1 po qhs Refills:  0  
   
 urea 40 % topical cream  
Commonly known as:  CARMOL  
 NAY MARBLE SIZED AMOUNT TO THICKENED AREAS ON BODY ONCE TO BID PRN Refills:  0  
   
 XANAX 2 mg tablet Generic drug:  ALPRAZolam  
 1 tablet by mouth every 8 hours as needed for severe anxiety Refills:  0 Surgery Information ID Date/Time Status Primary Surgeon All Procedures Location [id number] 8/22/2018 Unposted   [facility] RAD ANGIO IR OR-DO NOT SCHEDULE Follow-up Information Follow up With Details Comments Contact Info Phys Other, MD   Patient can only remember the practice name and not the physician Discharge Instructions [address] General Instructions: A biopsy is the removal of a small piece of tissue for microscopic examination or testing. Healthy tissue can be obtained for the purpose of tissue-type matching for transplants. Unhealthy tissues are more commonly biopsied to diagnose disease. General Biopsy: 
   A mass can grow in any area of the body, and we are taking a specimen as ordered by your doctor. The risks are the same. They include bleeding, pain, and infection. Home Care Instructions: You may resume your regular diet and medication regimen. Do not drink alcohol, drive, or make any important legal decisions in the next 24 hours. Do not lift anything heavier than a gallon of milk until the soreness goes away. You may use over the counter acetaminophen or ibuprofen for the soreness. You may apply an ice pack to the affected area for 20-30 minutes at time for the first 24 hours. After that, you may apply a heat pack. Call If: You should call your Physician and/or the Radiology Nurse if you have any questions or concerns about the biopsy site. Call if you should have increased pain, fever, redness, drainage, or bleeding more than a small spot on the bandage. Follow-Up Instructions: Please see your ordering doctor as he/she has requested. To Reach Us:   
 
 
Patient Signature: 
Date: 8/22/2018 Discharging Nurse: Ryan Farah RN 
 
 
 
 
 
 
 
DISCHARGE SUMMARY from Nurse PATIENT INSTRUCTIONS: 
 
 
F-face looks uneven A-arms unable to move or move unevenly S-speech slurred or non-existent T-time-call 911 as soon as signs and symptoms begin-DO NOT go Back to bed or wait to see if you get better-TIME IS BRAIN. Warning Signs of HEART ATTACK Call 911 if you have these symptoms: 
? Chest discomfort.  Most heart attacks involve discomfort in the center of the chest that lasts more than a few minutes, or that goes away and comes back. It can feel like uncomfortable pressure, squeezing, fullness, or pain. ? Discomfort in other areas of the upper body. Symptoms can include pain or discomfort in one or both arms, the back, neck, jaw, or stomach. ? Shortness of breath with or without chest discomfort. ? Other signs may include breaking out in a cold sweat, nausea, or lightheadedness. Don't wait more than five minutes to call [address]! Fast action can save your life. Calling 911 is almost always the fastest way to get lifesaving treatment. Emergency Medical Services staff can begin treatment when they arrive  up to an hour sooner than if someone gets to the hospital by car. The discharge information has been reviewed with the patient and caregiver. The patient and caregiver verbalized understanding. Discharge medications reviewed with the patient and caregiver and appropriate educational materials and side effects teaching were provided. Patient armband removed and shredded 
 
___________________________________________________________________________________________________________________________________ Chart Review Routing History No Routing History on File

## 2018-08-22 NOTE — DISCHARGE INSTRUCTIONS
[facility] INSTRUCTIONS    General Instructions:     A biopsy is the removal of a small piece of tissue for microscopic examination or testing. Healthy tissue can be obtained for the purpose of tissue-type matching for transplants. Unhealthy tissues are more commonly biopsied to diagnose disease. General Biopsy:     A mass can grow in any area of the body, and we are taking a specimen as ordered by your doctor. The risks are the same. They include bleeding, pain, and infection. Home Care Instructions: You may resume your regular diet and medication regimen. Do not drink alcohol, drive, or make any important legal decisions in the next 24 hours. Do not lift anything heavier than a gallon of milk until the soreness goes away. You may use over the counter acetaminophen or ibuprofen for the soreness. You may apply an ice pack to the affected area for 20-30 minutes at time for the first 24 hours. After that, you may apply a heat pack. Call If: You should call your Physician and/or the Radiology Nurse if you have any questions or concerns about the biopsy site. Call if you should have increased pain, fever, redness, drainage, or bleeding more than a small spot on the bandage. Follow-Up Instructions: Please see your ordering doctor as he/she has requested. To Reach Us:        Patient Signature:  Date: 8/22/2018  Discharging Nurse: Jonatan Amado RN                DISCHARGE SUMMARY from Nurse    PATIENT INSTRUCTIONS:    After general anesthesia or intravenous sedation, for 24 hours or while taking prescription Narcotics:  · Limit your activities  · Do not drive and operate hazardous machinery  · Do not make important personal or business decisions  · Do  not drink alcoholic beverages  · If you have not urinated within 8 hours after discharge, please contact your surgeon on call.     Report the following to your surgeon:  · Excessive pain, swelling, redness or odor of or around the surgical area  · Temperature over 100.5  · Nausea and vomiting lasting longer than 4 hours or if unable to take medications  · Any signs of decreased circulation or nerve impairment to extremity: change in color, persistent  numbness, tingling, coldness or increase pain  · Any questions    What to do at Home:  Recommended activity: Activity as tolerated,       *  Please give a list of your current medications to your Primary Care Provider. *  Please update this list whenever your medications are discontinued, doses are      changed, or new medications (including over-the-counter products) are added. *  Please carry medication information at all times in case of emergency situations. These are general instructions for a healthy lifestyle:    No smoking/ No tobacco products/ Avoid exposure to second hand smoke  Surgeon General's Warning:  Quitting smoking now greatly reduces serious risk to your health. Obesity, smoking, and sedentary lifestyle greatly increases your risk for illness    A healthy diet, regular physical exercise & weight monitoring are important for maintaining a healthy lifestyle    You may be retaining fluid if you have a history of heart failure or if you experience any of the following symptoms:  Weight gain of 3 pounds or more overnight or 5 pounds in a week, increased swelling in our hands or feet or shortness of breath while lying flat in bed. Please call your doctor as soon as you notice any of these symptoms; do not wait until your next office visit. Recognize signs and symptoms of STROKE:    F-face looks uneven    A-arms unable to move or move unevenly    S-speech slurred or non-existent    T-time-call 911 as soon as signs and symptoms begin-DO NOT go       Back to bed or wait to see if you get better-TIME IS BRAIN. Warning Signs of HEART ATTACK     Call 911 if you have these symptoms:   Chest discomfort.  Most heart attacks involve discomfort in the center of the chest that lasts more than a few minutes, or that goes away and comes back. It can feel like uncomfortable pressure, squeezing, fullness, or pain.  Discomfort in other areas of the upper body. Symptoms can include pain or discomfort in one or both arms, the back, neck, jaw, or stomach.  Shortness of breath with or without chest discomfort.  Other signs may include breaking out in a cold sweat, nausea, or lightheadedness. Don't wait more than five minutes to call 911 - MINUTES MATTER! Fast action can save your life. Calling 911 is almost always the fastest way to get lifesaving treatment. Emergency Medical Services staff can begin treatment when they arrive -- up to an hour sooner than if someone gets to the hospital by car. The discharge information has been reviewed with the patient and caregiver. The patient and caregiver verbalized understanding. Discharge medications reviewed with the patient and caregiver and appropriate educational materials and side effects teaching were provided.     Patient armband removed and shredded    ___________________________________________________________________________________________________________________________________

## 2018-08-27 ENCOUNTER — TELEPHONE (OUTPATIENT)
Dept: ONCOLOGY | Age: 58
End: 2018-08-27

## 2018-08-27 NOTE — TELEPHONE ENCOUNTER
Spoke with Dr. Camron Myers re: most recent workup, FNA of pelvic mass c/w leiomyoma which is suspected to be a sampling error as this may be a leiomyosarcoma in addition to the primary lung ca per Dr. Camron Myers. He requests that Dr. Trace Santana see patient to further discuss resection of pelvic mass/TLH for definitive diagnosis. Will return call to Dr. Sidhu Common office with appt date. Spoke with patient re: above discussion, transferred to schedule first available appt with Dr. Trace Santana on 9/4/18. Patient advised to call with any questions or concerns prior appt. Patient agreeable.

## 2018-08-30 ENCOUNTER — TELEPHONE (OUTPATIENT)
Dept: ONCOLOGY | Age: 58
End: 2018-08-30

## 2018-08-30 NOTE — TELEPHONE ENCOUNTER
Patient called wanting to know if it would be ok to get a Tdap shot for whooping cough? Patient stated she was unsure if she should, not wanting to out anything in her body being that she has cancer. Patient would like to be contacted this morning.

## 2018-09-04 ENCOUNTER — OFFICE VISIT (OUTPATIENT)
Dept: ONCOLOGY | Age: 58
End: 2018-09-04

## 2018-09-04 VITALS
BODY MASS INDEX: 27.52 KG/M2 | HEIGHT: 68 IN | HEART RATE: 92 BPM | RESPIRATION RATE: 16 BRPM | OXYGEN SATURATION: 97 % | DIASTOLIC BLOOD PRESSURE: 81 MMHG | SYSTOLIC BLOOD PRESSURE: 125 MMHG | WEIGHT: 181.6 LBS | TEMPERATURE: 97 F

## 2018-09-04 DIAGNOSIS — R19.00 PELVIC MASS IN FEMALE: Primary | ICD-10-CM

## 2018-09-04 NOTE — PROGRESS NOTES
[facility]  [facility], [address], [address] [facility]  [address] [facility], [address]  [place], [address]   [phone]  Vincent Motta DO      Patient ID:  Name:  Mariajose Bro  MRN:  [id number]  :  1960/58 y.o. Date:  2018      HISTORY OF PRESENT ILLNESS:  Mariajose Bro is a 59 y.o.  postmenopausal female referred by Dr. New Sims for pelvic mass. Pt underwent  TRACHELECTOMY,ANTERIOR AND POSTERIOR REPAIR,SACROSPINOUS LIGAMENT SUSPENSION,CYSTOSCOPY on 2018 with dr. New Sims for prolapse and was found to have a large abdominopelvic mass. A CT was performed as below. 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 Labs:  Pathology 2018     PELVIC MASS, CT-GUIDED CORE BIOPSY:   HISTOLOGIC FINDINGS CONSISTENT WITH LEIOMYOMA. Pathology 2018     RIGHT LUNG MASS, CORE BIOPSIES:   CONSISTENT WITH ADENOCARCINOMA OF BRONCHOGENIC ORIGIN. Component      Latest Ref Rng & Units 2018           2:34 PM  2:34 PM  2:34 PM   Cancer Ag (CA) 125      0.0 - 38.1 U/mL  18.6    Carbohydrate Antigen 19-9, (CA 19-9)      0 - 35 U/mL 5     CEA      ng/mL   1.5         Imaging  FINDINGS:     LOWER CHEST: Partial visualized Right lower lobe 1.5 cm pulmonary nodule with  irregular spiculated-type margins, as visualized on the first axial slice. No  pericardial or pleural effusion.     LIVER, BILIARY: No suspicious enhancing hepatic mass or lesion. Mild  intrahepatic and extra ventricular duct dilatation, favoring post  cholecystectomy reservoir artifact.     PANCREAS: No suspicious enhancing mass or lesion. Within normal limits.     SPLEEN: Normal in size and attenuation.  Rounded 7 mm splenule .     ADRENALS: Normal.     KIDNEYS: Symmetric enhancing bilateral kidneys with mild right-sided  hydronephrosis and hydroureter. No discrete left-sided hydronephrosis or left  hydroureter.     LYMPH NODES: Distal left periaortic 8mm AP dimension by 2.4 cm (S/I) tubular  shaped structure (axial image 65/coronal image 57) which may represent an  enlarged lymph node versus dilated vascular structure.        GASTROINTESTINAL TRACT: No bowel dilation or wall thickening.     PELVIC ORGANS, OTHER: Large multilobulated enhancing solid soft tissue pelvic  mass extending to the mid abdomen, measuring 27.4 x 21.5 x 11.2 cm (S/I by  transverse by AP). There is adjacent mass effect upon the bladder which is  anteriorly displaced as well as the bowel loops. This mass abuts the cervical  hysterectomy stump with asymmetric right pelvic extension.  -Lateral left mid pelvic plaque-like soft tissue measuring 2.9 x 1.4 cm (image  82) with additional areas of subtle nodularity anterior left hemipelvis (image  77) and right lateral abdomen (image 66).    VASCULATURE: Unremarkable.     BONES: No acute or aggressive osseous abnormalities identified.     OTHER: Right posterior inferior gluteal soft tissue enhancing mass measuring 1.5  cm (axial image 132). .     _______________     IMPRESSION  IMPRESSION:     1. Large soft tissue enhancing multilobulated mass arising from the lower  pelvis, probably right adnexa extending to the mid abdomen, measuring 27 x 21 x  11 cm, in a patient with prior hysterectomy. Differential considerations are  highly concerning for ovarian neoplasm. Recommend gynecologic oncology  consultation.        2. Left lateral pelvic plaque-like soft tissue and right mid abdomen 5 mm  nodule, concerning for peritoneal metastasis.     3. Partial visualized right lower lobe spiculated 1.5 cm nodule, metastatic  disease versus primary pulmonary malignancy.  Recommend follow-up dedicated  contrast-enhanced completion CT scan of the chest, which can be performed on a  nonemergent basis.     4. Right posterior inferior buttock partial visualized 1.5 cm rim-enhancing soft  tissue nodule. While indeterminate there is concern that this may represent soft  tissue metastasis. 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 2018     Past Medical History:   Diagnosis Date    Arthritis     knees    Autoimmune disease ([facility] [address] [address]     psoriasis    Diabetes ([facility] [address] 2018    Lung cancer ([facility] [address] [facility]     Psoriasis     Psychiatric disorder     anxiety      Past Surgical History:   Procedure Laterality Date    ABDOMEN SURGERY PROC UNLISTED      12 inch of colon removed r/t non cancerous tumor    HX APPENDECTOMY      HX CHOLECYSTECTOMY      HX HYSTERECTOMY      HX TONSILLECTOMY      HX TUBAL LIGATION      HX UROLOGICAL      bladder lift      OB History      Para Term  AB Living    2 2 2   2    SAB TAB Ectopic Molar Multiple Live Births         2        Social History   Substance Use Topics    Smoking status: Former Smoker     Quit date: 2015    Smokeless tobacco: Current User      Comment: \"vape some\"    Alcohol use No      Family History   Problem Relation Age of Onset    Diabetes Mother     Coronary Artery Disease Mother     Alzheimer Mother     Anxiety Mother     Hypertension Father     Emphysema Father       Current Outpatient Prescriptions   Medication Sig    ALPRAZolam (XANAX) 2 mg tablet 1 tablet by mouth every 8 hours as needed for severe anxiety    atorvastatin (LIPITOR) 20 mg tablet TAKE 1 TABLET BY MOUTH bedtime    clobetasol (TEMOVATE) 0.05 % ointment Apply  to affected area.  hydrOXYzine HCl (ATARAX) 10 mg tablet Take 10 mg by mouth. Indications: psoriasis    metFORMIN (GLUCOPHAGE) 500 mg tablet Take 500 mg by mouth nightly.  Indications: type 2 diabetes mellitus    B infantis/B ani/B cristine/B bifid (PROBIOTIC 4X PO) Take 1 Tab by mouth.  traZODone (DESYREL) 150 mg tablet 1 po qhs    urea (CARMOL) 40 % topical cream NAY MARBLE SIZED AMOUNT TO THICKENED AREAS ON BODY ONCE TO BID PRN    diclofenac EC (VOLTAREN) 75 mg EC tablet Take  by mouth two (2) times daily as needed.  oxyCODONE-acetaminophen (PERCOCET) 5-325 mg per tablet Take 1 Tab by mouth every four (4) hours as needed. Max Daily Amount: 6 Tabs.  etanercept (ENBREL) 50 mg/mL (0.98 mL) injection by SubCUTAneous route Every Thursday. No current facility-administered medications for this visit. Allergies   Allergen Reactions    Bee Venom Protein (Honey Bee) Anaphylaxis    Lemon Hives     fresh    Vicodin [Hydrocodone-Acetaminophen] Hives, Itching and Swelling          OBJECTIVE:    Physical Exam  VITAL SIGNS: Visit Vitals    /81 (BP 1 Location: Right arm, BP Patient Position: Sitting)    Pulse 92    Temp 97 °F (36.1 °C) (Oral)    Resp 16    Ht 5' 8\" (1.727 m)    Wt 82.4 kg (181 lb 9.6 oz)    SpO2 97%    BMI 27.61 kg/m2      GENERAL NAY: in no apparent distress and well developed and well nourished   MUSCULOSKEL: no joint tenderness, deformity or swelling   INTEGUMENT:  warm and dry, no rashes or lesions   ABDOMEN . soft, NT, ND, pelvic mass palpable to deep palpation   EXTREMITIES: extremities normal, atraumatic, no cyanosis or edema   PELVIC: Exam deferred. RECTAL: deferred   SHIRLENE SURVEY: Cervical, supraclavicular, axillary and inguinal nodes normal.   NEURO: Grossly normal         IMPRESSION/PLAN:  1.  Pelvic mass, Lung cancer,  peritoneal nodules, buttock nodule   -discussed  possible sampling error from FNA; recommend definitive diagnosis with excision of mass   -plan for xlap, resection of pelvic mass, possible BSO on 9/10/2018   -will f/u with thoracic re: lung cancer post pelvic surgery   -Risks, benefits and alternatives of surgery discussed in detail      The total time spent was 25 minutes regarding this patients diagnosis of pelvic mass, lung cancer and >50% of this time was spent counseling and coordinating care    [facility]  Gynecologic Oncology  3/3/30293:73 AM

## 2018-09-04 NOTE — MR AVS SNAPSHOT
[address] [facility] 
 
 
 One [facility] [address] [place] [address] 
[phone] Patient: Kurt Yang MRN: [id number] Visit Information Date & Time Provider Department Dept. Phone Encounter #  
 9/4/2018  3:30 PM Nick Parra [facility] [phone] Your Appointments 9/6/2018  9:30 AM  
SURGERY CONSULT with BS[facility] NURSE [facility] ([facility]) Appt Note: surgery counseling  
 One [address] Upcoming Health Maintenance Date Due Hepatitis C Screening 1960 DTaP/Tdap/Td series (1 - Tdap) 4/28/1979 PAP AKA CERVICAL CYTOLOGY 4/28/1979 BREAST CANCER SCRN MAMMOGRAM 4/28/2008 FOBT Q 1 YEAR AGE 50-75 4/28/2008 ZOSTER VACCINE AGE 60> 2/28/2018 Influenza Age 5 to Adult 8/1/2018 Allergies as of 9/4/2018  Review Complete On: 9/4/2018 By: Stalin Stearns MD  
  
 Severity Noted Reaction Type Reactions Bee Venom Protein (Honey Bee) High 07/13/2018    Anaphylaxis Lemon  06/26/2018    Hives  
 fresh Vicodin [Hydrocodone-acetaminophen]  06/26/2018    Hives, Itching, Swelling Current Immunizations  Reviewed on 7/13/2018 No immunizations on file. Not reviewed this visit Vitals BP Pulse Temp Resp Height(growth percentile) Weight(growth percentile) 125/81 (BP 1 Location: Right arm, BP Patient Position: Sitting) 92 97 °F (36.1 °C) (Oral) 16 5' 8\" (1.727 m) 181 lb 9.6 oz (82.4 kg) SpO2 BMI OB Status Smoking Status 97% 27.61 kg/m2 Hysterectomy Former Smoker BMI and BSA Data Body Mass Index Body Surface Area  
 27.61 kg/m 2 1.99 m 2 Preferred Pharmacy Pharmacy Name Phone  [address] AT [address] [facility] Your Updated Medication List  
  
   
This list is accurate as of 9/4/18  3:58 PM.  Always use your most recent med list.  
  
  
  
  
 atorvastatin 20 mg tablet Commonly known as:  LIPITOR  
TAKE 1 TABLET BY MOUTH bedtime  
  
 clobetasol 0.05 % ointment Commonly known as:  Gladys Bitter Apply  to affected area. diclofenac EC 75 mg EC tablet Commonly known as:  VOLTAREN Take  by mouth two (2) times daily as needed. ENBREL 50 mg/mL (0.98 mL) injection Generic drug:  etanercept  
by SubCUTAneous route Every Thursday. hydrOXYzine HCl 10 mg tablet Commonly known as:  ATARAX Take 10 mg by mouth. Indications: psoriasis  
  
 metFORMIN 500 mg tablet Commonly known as:  GLUCOPHAGE Take 500 mg by mouth nightly. Indications: type 2 diabetes mellitus  
  
 oxyCODONE-acetaminophen 5-325 mg per tablet Commonly known as:  PERCOCET Take 1 Tab by mouth every four (4) hours as needed. Max Daily Amount: 6 Tabs. PROBIOTIC 4X PO Take 1 Tab by mouth. traZODone 150 mg tablet Commonly known as:  Marla Rebecca 1 po qhs  
  
 urea 40 % topical cream  
Commonly known as:  CARMOL  
NAY MARBLE SIZED AMOUNT TO THICKENED AREAS ON BODY ONCE TO BID PRN  
  
 XANAX 2 mg tablet Generic drug:  ALPRAZolam  
1 tablet by mouth every 8 hours as needed for severe anxiety Introducing [facility]! Dear Deejay Maxwell: 
Thank you for requesting a [employer] account. Our records indicate that you already have an active [employer] account. You can access your account anytime at [email or web address] Did you know that you can access your hospital and ER discharge instructions at any time in [employer]? You can also review all of your test results from your hospital stay or ER visit. Additional Information If you have questions, please visit the Frequently Asked Questions section of the [facility] website at [email or web address] Remember, [facility] is NOT to be used for urgent needs. For medical emergencies, dial 911. Now available from your iPhone and Android! Please provide this summary of care documentation to your next provider. Your primary care clinician is listed as Phys Other. If you have any questions after today's visit, please call [phone].

## 2018-09-04 NOTE — PROGRESS NOTES
Konrad Castaneda, a 59 y.o. female,  is here for   Chief Complaint   Patient presents with    Other     Discuss Surgery       Visit Vitals    /81 (BP 1 Location: Right arm, BP Patient Position: Sitting)    Pulse 92    Temp 97 °F (36.1 °C) (Oral)    Resp 16    Ht 5' 8\" (1.727 m)    Wt 82.4 kg (181 lb 9.6 oz)    SpO2 97%    BMI 27.61 kg/m2     Patient reports intermittent constipation and diarrhea, was told that it may be related to her pelvic mass. Patient denies any persistent or worsening abdominal or pelvic pain. Denies any unusual vaginal bleeding, discharge, irritation, or odor. No burning, discomfort, or irritation with urination. 1. Have you been to the ER, urgent care clinic since your last visit? Hospitalized since your last visit? No    2. Have you seen or consulted any other health care providers outside of the [facility] since your last visit? Include any pap smears or colon screening.  No

## 2018-09-05 ENCOUNTER — HOSPITAL ENCOUNTER (OUTPATIENT)
Dept: LAB | Age: 58
Discharge: HOME | End: 2018-09-05
Attending: OBSTETRICS & GYNECOLOGY
Payer: COMMERCIAL

## 2018-09-05 ENCOUNTER — HOSPITAL ENCOUNTER (OUTPATIENT)
Dept: PREADMISSION TESTING | Age: 58
Discharge: HOME | End: 2018-09-05
Attending: OBSTETRICS & GYNECOLOGY
Payer: COMMERCIAL

## 2018-09-05 LAB
ALBUMIN SERPL-MCNC: 3.6 G/DL (ref 3.4–5)
ALBUMIN/GLOB SERPL: 1.2 {RATIO} (ref 0.8–1.7)
ALP SERPL-CCNC: 78 U/L (ref 45–117)
ALT SERPL-CCNC: 49 U/L (ref 13–56)
ANION GAP SERPL CALC-SCNC: 4 MMOL/L (ref 3–18)
AST SERPL-CCNC: 27 U/L (ref 15–37)
BASOPHILS # BLD: 0 K/UL (ref 0–0.1)
BASOPHILS NFR BLD: 1 % (ref 0–2)
BILIRUB SERPL-MCNC: 0.3 MG/DL (ref 0.2–1)
BUN SERPL-MCNC: 12 MG/DL (ref 7–18)
BUN/CREAT SERPL: 16 (ref 12–20)
CALCIUM SERPL-MCNC: 8.8 MG/DL (ref 8.5–10.1)
CHLORIDE SERPL-SCNC: 106 MMOL/L (ref 100–108)
CO2 SERPL-SCNC: 30 MMOL/L (ref 21–32)
CREAT SERPL-MCNC: 0.75 MG/DL (ref 0.6–1.3)
DIFFERENTIAL METHOD BLD: ABNORMAL
EOSINOPHIL # BLD: 0.4 K/UL (ref 0–0.4)
EOSINOPHIL NFR BLD: 6 % (ref 0–5)
ERYTHROCYTE [DISTWIDTH] IN BLOOD BY AUTOMATED COUNT: 14.5 % (ref 11.6–14.5)
EST. AVERAGE GLUCOSE BLD GHB EST-MCNC: 134 MG/DL
GLOBULIN SER CALC-MCNC: 3.1 G/DL (ref 2–4)
GLUCOSE SERPL-MCNC: 96 MG/DL (ref 74–99)
HBA1C MFR BLD: 6.3 % (ref 4.5–5.6)
HCT VFR BLD AUTO: 41.4 % (ref 35–45)
HGB BLD-MCNC: 13.3 G/DL (ref 12–16)
LYMPHOCYTES # BLD: 2.3 K/UL (ref 0.9–3.6)
LYMPHOCYTES NFR BLD: 36 % (ref 21–52)
MCH RBC QN AUTO: 28.5 PG (ref 24–34)
MCHC RBC AUTO-ENTMCNC: 32.1 G/DL (ref 31–37)
MCV RBC AUTO: 88.8 FL (ref 74–97)
MONOCYTES # BLD: 0.3 K/UL (ref 0.05–1.2)
MONOCYTES NFR BLD: 5 % (ref 3–10)
NEUTS SEG # BLD: 3.3 K/UL (ref 1.8–8)
NEUTS SEG NFR BLD: 52 % (ref 40–73)
PLATELET # BLD AUTO: 286 K/UL (ref 135–420)
PMV BLD AUTO: 9 FL (ref 9.2–11.8)
POTASSIUM SERPL-SCNC: 4.3 MMOL/L (ref 3.5–5.5)
PROT SERPL-MCNC: 6.7 G/DL (ref 6.4–8.2)
RBC # BLD AUTO: 4.66 M/UL (ref 4.2–5.3)
SODIUM SERPL-SCNC: 140 MMOL/L (ref 136–145)
WBC # BLD AUTO: 6.4 K/UL (ref 4.6–13.2)

## 2018-09-05 PROCEDURE — 93005 ELECTROCARDIOGRAM TRACING: CPT

## 2018-09-05 PROCEDURE — 85025 COMPLETE CBC W/AUTO DIFF WBC: CPT | Performed by: OBSTETRICS & GYNECOLOGY

## 2018-09-05 PROCEDURE — 83036 HEMOGLOBIN GLYCOSYLATED A1C: CPT | Performed by: OBSTETRICS & GYNECOLOGY

## 2018-09-05 PROCEDURE — 80053 COMPREHEN METABOLIC PANEL: CPT | Performed by: OBSTETRICS & GYNECOLOGY

## 2018-09-05 PROCEDURE — 86920 COMPATIBILITY TEST SPIN: CPT | Performed by: OBSTETRICS & GYNECOLOGY

## 2018-09-05 PROCEDURE — 86900 BLOOD TYPING SEROLOGIC ABO: CPT | Performed by: OBSTETRICS & GYNECOLOGY

## 2018-09-05 PROCEDURE — 36415 COLL VENOUS BLD VENIPUNCTURE: CPT | Performed by: OBSTETRICS & GYNECOLOGY

## 2018-09-06 ENCOUNTER — TELEPHONE (OUTPATIENT)
Dept: ONCOLOGY | Age: 58
End: 2018-09-06

## 2018-09-06 ENCOUNTER — DOCUMENTATION ONLY (OUTPATIENT)
Dept: ONCOLOGY | Age: 58
End: 2018-09-06

## 2018-09-06 ENCOUNTER — OFFICE VISIT (OUTPATIENT)
Dept: ONCOLOGY | Age: 58
End: 2018-09-06

## 2018-09-06 VITALS
BODY MASS INDEX: 28.31 KG/M2 | SYSTOLIC BLOOD PRESSURE: 127 MMHG | DIASTOLIC BLOOD PRESSURE: 80 MMHG | TEMPERATURE: 98.1 F | OXYGEN SATURATION: 98 % | HEIGHT: 67 IN | RESPIRATION RATE: 18 BRPM | HEART RATE: 68 BPM | WEIGHT: 180.4 LBS

## 2018-09-06 DIAGNOSIS — Z71.89 ENCOUNTER FOR SURGICAL COUNSELING: Primary | ICD-10-CM

## 2018-09-06 NOTE — TELEPHONE ENCOUNTER
Patient called the office and spoke with  Emiliano Barreto. She stated that she had spoken to Dr. Maria E Lorenzo and at the time could not remember a buttocks nodule but has since remembered having one. She would like to know if she will be able to speak with Dr. Maria E Lorenzo prior to surgery. Her number is [phone].

## 2018-09-06 NOTE — PATIENT INSTRUCTIONS
Laparoscopic Oophorectomy: Before Your Surgery  What is a laparoscopic oophorectomy? Oophorectomy (say \"up-vx-kru-REK-tuh-samuel\") is a type of surgery. It removes one or both of your ovaries. Your ovaries store and release eggs so that you can get pregnant. They also make female sex hormones. You will be asleep during the surgery. The doctor puts a lighted tube and other tools through small cuts in your belly. The cuts are called incisions. The tube is called a scope. It lets your doctor see your ovaries. If it's too hard to work through the scope, the doctor may make a larger incision. The incisions leave scars that fade with time. After surgery, you will probably have pain for several days. If the doctor takes out both ovaries, you can no longer get pregnant. You will also start menopause if you haven't already started it. Follow-up care is a key part of your treatment and safety. Be sure to make and go to all appointments, and call your doctor if you are having problems. It's also a good idea to know your test results and keep a list of the medicines you take. What happens before surgery?   Surgery can be stressful. This information will help you understand what you can expect. And it will help you safely prepare for surgery.   Preparing for surgery    · Understand exactly what surgery is planned, along with the risks, benefits, and other options. · Tell your doctors ALL the medicines, vitamins, supplements, and herbal remedies you take. Some of these can increase the risk of bleeding or interact with anesthesia.     · If you take blood thinners, such as warfarin (Coumadin), clopidogrel (Plavix), or aspirin, be sure to talk to your doctor. He or she will tell you if you should stop taking these medicines before your surgery. Make sure that you understand exactly what your doctor wants you to do.     · Your doctor will tell you which medicines to take or stop before your surgery.  You may need to stop taking certain medicines a week or more before surgery. So talk to your doctor as soon as you can.     · If you have an advance directive, let your doctor know. It may include a living will and a durable power of  for health care. Bring a copy to the hospital. If you don't have one, you may want to prepare one. It lets your doctor and loved ones know your health care wishes. Doctors advise that everyone prepare these papers before any type of surgery or procedure. What happens on the day of surgery? · Follow the instructions exactly about when to stop eating and drinking. If you don't, your surgery may be canceled. If your doctor told you to take your medicines on the day of surgery, take them with only a sip of water.     · Take a bath or shower before you come in for your surgery. Do not apply lotions, perfumes, deodorants, or nail polish.     · Do not shave the surgical site yourself.     · Take off all jewelry and piercings. And take out contact lenses, if you wear them.    At the hospital or surgery center   · Bring a picture ID.     · The area for surgery is often marked to make sure there are no errors.     · You will be kept comfortable and safe by your anesthesia provider. You will be asleep during the surgery. Going home   · Be sure you have someone to drive you home. Anesthesia and pain medicine make it unsafe for you to drive.     · You will be given more specific instructions about recovering from your surgery. They will cover things like diet, wound care, follow-up care, driving, and getting back to your normal routine. When should you call your doctor? · You have questions or concerns.     · You don't understand how to prepare for your surgery.     · You become ill before the surgery (such as fever, flu, or a cold).     · You need to reschedule or have changed your mind about having the surgery. Where can you learn more? Go to http://sujit-colleen.info/.   Enter C476 in the search box to learn more about \"Laparoscopic Oophorectomy: Before Your Surgery. \"  Current as of: Janell 10, 2017  Content Version: 11.7  © 7705-1175 [employer]. Care instructions adapted under license by [employer] (which disclaims liability or warranty for this information). If you have questions about a medical condition or this instruction, always ask your healthcare professional. [place] [address] any warranty or liability for your use of this information. Laparoscopic Oophorectomy: What to Expect at [address] [place] [address]    After surgery to remove one or both ovaries, you may feel some pain in your belly for a few days. Your belly may also be swollen. You may have a change in your bowel movements for a few days. It's normal to also have some shoulder or back pain. This is caused by the gas your doctor put in your belly to help see your organs better. To help with pain, your doctor will prescribe medicines. You may need about 1 week to fully recover. It's important not to lift anything heavy for about 1 week. You can ask your doctor when it's okay to have sex. This care sheet gives you a general idea about how long it will take for you to recover. But each person recovers at a different pace. Follow the steps below to get better as quickly as possible. How can you care for yourself at home? Activity    · Rest when you feel tired.     · Be active. Walking is a good choice.     · Allow your body to heal. Don't move quickly or lift anything heavy until you are feeling better.     · Hold a pillow over your incisions when you cough or take deep breaths. This will support your belly and may help to decrease your pain.     · Do breathing exercises at home as instructed by your doctor. This will help prevent pneumonia. Diet    · You can eat your normal diet.  If your stomach is upset, try bland, low-fat foods like plain rice, broiled chicken, toast, and yogurt.     · Drink plenty of fluids (unless your doctor tells you not to).   · If your bowel movements are not regular right after surgery, try to avoid constipation and straining. Drink plenty of water. Your doctor may suggest fiber, a stool softener, or a mild laxative. Medicines    · Your doctor will tell you if and when you can restart your medicines. He or she will also give you instructions about taking any new medicines.     · If you take blood thinners, such as warfarin (Coumadin), clopidogrel (Plavix), or aspirin, be sure to talk to your doctor. He or she will tell you if and when to start taking those medicines again. Make sure that you understand exactly what your doctor wants you to do.     · Be safe with medicines. Read and follow all instructions on the label. ¨ If the doctor gave you a prescription medicine for pain, take it as prescribed. ¨ If you are not taking a prescription pain medicine, ask your doctor if you can take an over-the-counter medicine. Incision care    · If you have strips of tape on the cut (incision) the doctor made, leave the tape on for a week or until it falls off.     · Wash the area daily with warm, soapy water, and pat it dry. Don't use hydrogen peroxide or alcohol. They can slow healing.     · You may cover the area with a gauze bandage if it oozes fluid or rubs against clothing.     · Change the bandage every day.     · Keep the area clean and dry. Other instructions    · Wear loose, comfortable clothing. For a few weeks, avoid anything that puts pressure on your belly.     · You may want to use a heating pad on your belly to help with pain. Follow-up care is a key part of your treatment and safety. Be sure to make and go to all appointments, and call your doctor if you are having problems. It's also a good idea to know your test results and keep a list of the medicines you take. When should you call for help?   Call 911 anytime you think you may need emergency care. For example, call if:    · You passed out (lost consciousness).     · You have chest pain, are short of breath, or cough up blood.    Call your doctor now or seek immediate medical care if:    · You have pain that does not get better after you take pain medicine.     · You cannot pass stools or gas.     · You have vaginal discharge that has increased in amount or smells bad.     · You are sick to your stomach or cannot drink fluids.     · You have loose stitches, or your incision comes open.     · Bright red blood has soaked through the bandage over your incision.     · You have signs of infection, such as:  ¨ Increased pain, swelling, warmth, or redness. ¨ Red streaks leading from the incision. ¨ Pus draining from the incision. ¨ A fever.     · You have bright red vaginal bleeding that soaks one or more pads in an hour, or you have large clots.     · You have signs of a blood clot in your leg (called a deep vein thrombosis), such as:  ¨ Pain in your calf, back of the knee, thigh, or groin. ¨ Redness and swelling in your leg.    Watch closely for changes in your health, and be sure to contact your doctor if you have any problems. Where can you learn more? Go to http://sujit-colleen.info/. Enter V762 in the search box to learn more about \"Laparoscopic Oophorectomy: What to Expect at Home. \"  Current as of: Janell 10, 2017  Content Version: 11.7  © 6557-7804 [employer]. Care instructions adapted under license by [employer] (which disclaims liability or warranty for this information). If you have questions about a medical condition or this instruction, always ask your healthcare professional. [place] [address] any warranty or liability for your use of this information. Wound Care: After Your Visit  Your Care Instructions  Taking good care of your wound at home will help it heal quickly and reduce your chance of infection.   The doctor has checked you carefully, but problems can develop later. If you notice any problems or new symptoms, get medical treatment right away. Follow-up care is a key part of your treatment and safety. Be sure to make and go to all appointments, and call your doctor if you are having problems. It's also a good idea to know your test results and keep a list of the medicines you take. How can you care for yourself at home? · Clean the area with soap and water 2 times a day unless your doctor gives you different instructions. Don't use hydrogen peroxide or alcohol, which can slow healing. ¨ You may cover the wound with a thin layer of antibiotic ointment, such as bacitracin, and a nonstick bandage. ¨ Apply more ointment and replace the bandage as needed. · Take pain medicines exactly as directed. Some pain is normal with a wound, but do not ignore pain that is getting worse instead of better. You could have an infection. ¨ If the doctor gave you a prescription medicine for pain, take it as prescribed. ¨ If you are not taking a prescription pain medicine, ask your doctor if you can take an over-the-counter medicine. · Your doctor may have closed your wound with stitches (sutures), staples, or skin glue. ¨ If you have stitches, your doctor may remove them after several days to 2 weeks. Or you may have stitches that dissolve on their own. ¨ If you have staples, your doctor may remove them after 7 to 10 days. ¨ If your wound was closed with skin glue, the glue will wear off in a few days to 2 weeks. When should you call for help? Call your doctor now or seek immediate medical care if:  · You have signs of infection, such as:  ¨ Increased pain, swelling, warmth, or redness near the wound. ¨ Red streaks leading from the wound. ¨ Pus draining from the wound. ¨ A fever. · You bleed so much from your incision that you soak one or more bandages over 2 to 4 hours.   Watch closely for changes in your health, and be sure to contact your doctor if:  · The wound is not getting better each day. Where can you learn more? Go to [facility]be  Enter M973 in the search box to learn more about \"Wound Care: After Your Visit. \"   © 5377-3616 Healthwise, Incorporated. Care instructions adapted under license by [facility] (which disclaims liability or warranty for this information). This care instruction is for use with your licensed healthcare professional. If you have questions about a medical condition or this instruction, always ask your healthcare professional. [address] any warranty or liability for your use of this information. Content Version: 16.0.494002; Last Revised: April 23, 2012                 Infection After Surgery: Care Instructions  Your Care Instructions  After surgery, an infection is always possible. It doesn't mean that the surgery didn't go well. Because an infection can be serious, your doctor has taken steps to manage it. Your doctor checked the infection and cleaned it if necessary. He or she may have made an opening in the area so that the pus can drain out. You may have gauze in the cut so that the area will stay open and keep draining. You may need antibiotics. You will need to follow up with your doctor to make sure the infection has gone away. Follow-up care is a key part of your treatment and safety. Be sure to make and go to all appointments, and call your doctor if you are having problems. It's also a good idea to know your test results and keep a list of the medicines you take. How can you care for yourself at home? · Make sure your surgeon knows that you saw a doctor about the infection. · If your doctor prescribed antibiotics, take them as directed. Do not stop taking them just because you feel better. You need to take the full course of antibiotics.   · Ask your doctor if you can take an over-the-counter pain medicine, such as acetaminophen (Tylenol), ibuprofen (Advil, Motrin), or naproxen (Aleve). Be safe with medicines. Read and follow all instructions on the label. · Do not take two or more pain medicines at the same time unless the doctor told you to. Many pain medicines have acetaminophen, which is Tylenol. Too much acetaminophen (Tylenol) can be harmful. · Prop up the area on a pillow anytime you sit or lie down during the next 3 days. Try to keep it above the level of your heart. This will help reduce swelling. · Keep the skin clean and dry. · If you have a bandage, keep it clean and dry. · You may have a dressing over the cut (incision). A dressing helps the incision heal and protects it. Your doctor will tell you how to take care of this. You can expect drainage from the wound. · If your doctor told you how to care for your incision, follow your doctor's instructions. If you did not get instructions, follow this general advice:  ¨ Wash around the incision with clean water 2 times a day. Don't use hydrogen peroxide or alcohol, which can slow healing. When should you call for help? Call your doctor now or seek immediate medical care if:    · You have signs that your infection is getting worse, such as:  ¨ Increased pain, swelling, warmth, or redness in the area. ¨ Red streaks leading from the area. ¨ Pus draining from the wound. ¨ A new or higher fever.    Watch closely for changes in your health, and be sure to contact your doctor if you have any problems. Where can you learn more? Go to http://sujit-colleen.info/. Enter C340 in the search box to learn more about \"Infection After Surgery: Care Instructions. \"  Current as of: November 20, 2017  Content Version: 11.7  © 8580-5017 [employer]. Care instructions adapted under license by [employer] (which disclaims liability or warranty for this information).  If you have questions about a medical condition or this instruction, always ask your healthcare professional. [address] any warranty or liability for your use of this information.

## 2018-09-06 NOTE — PROGRESS NOTES
Alexei Diallo, a 59 y.o. female,  is here for   Chief Complaint   Patient presents with    Patient Education     surgical counseling for exploratory laparotomy, resection of pelvic mass, and bilateral salpingo-oophorectomy       Visit Vitals    /80 (BP 1 Location: Left arm, BP Patient Position: Sitting)    Pulse 68    Temp 98.1 °F (36.7 °C) (Oral)    Resp 18    Ht 5' 7\" (1.702 m)    Wt 81.8 kg (180 lb 6.4 oz)    SpO2 98%    BMI 28.25 kg/m2      Reviewed consent form for [facility] and information packet for a Exploratory laparotomy, resection of pelvic mass, and bilateral salpingo-oophorectomy scheduled on 09/10/2018 at 1230 with an arrival time of 1030. Patient was given information packet that included pre-op and post-op instructions as well as the scheduled date, start time, arrival time, and length of the surgery and signed the consent form. Patient expressed understanding and had no further questions. Patient was encouraged to call if they have questions later.

## 2018-09-06 NOTE — PROGRESS NOTES
Reviewed PETCT with no activity in pelvic mass  Some activity within lung lesion and lesion in buttock  Will plan for surgery as scheduled on monday

## 2018-09-06 NOTE — TELEPHONE ENCOUNTER
After d/w you via phon earlier, patient wanted to let you know that she does recall a  Non-tender pea-sized nodule on her \"right side buttcrack\" that's been there for years.

## 2018-09-07 ENCOUNTER — TELEPHONE (OUTPATIENT)
Dept: ONCOLOGY | Age: 58
End: 2018-09-07

## 2018-09-07 LAB
ATRIAL RATE: 54 BPM
CALCULATED P AXIS, ECG09: 42 DEGREES
CALCULATED R AXIS, ECG10: 40 DEGREES
CALCULATED T AXIS, ECG11: 46 DEGREES
DIAGNOSIS, 93000: NORMAL
P-R INTERVAL, ECG05: 158 MS
Q-T INTERVAL, ECG07: 438 MS
QRS DURATION, ECG06: 74 MS
QTC CALCULATION (BEZET), ECG08: 415 MS
VENTRICULAR RATE, ECG03: 54 BPM

## 2018-09-07 NOTE — TELEPHONE ENCOUNTER
Spoke with patient to confirm 10:30am arrival time for a 12:30 procedure with Dr. Hadley Pro on 9/10/18.

## 2018-09-09 ENCOUNTER — ANESTHESIA EVENT (OUTPATIENT)
Dept: SURGERY | Age: 58
DRG: 982 | End: 2018-09-09
Payer: COMMERCIAL

## 2018-09-10 ENCOUNTER — HOSPITAL ENCOUNTER (INPATIENT)
Age: 58
LOS: 2 days | Discharge: HOME | DRG: 982 | End: 2018-09-12
Attending: OBSTETRICS & GYNECOLOGY | Admitting: OBSTETRICS & GYNECOLOGY
Payer: COMMERCIAL

## 2018-09-10 ENCOUNTER — ANESTHESIA (OUTPATIENT)
Dept: SURGERY | Age: 58
DRG: 982 | End: 2018-09-10
Payer: COMMERCIAL

## 2018-09-10 DIAGNOSIS — G89.18 POSTOPERATIVE PAIN: Primary | ICD-10-CM

## 2018-09-10 PROBLEM — R19.00 PELVIC MASS IN FEMALE: Status: ACTIVE | Noted: 2018-09-10

## 2018-09-10 LAB
GLUCOSE BLD STRIP.AUTO-MCNC: 100 MG/DL (ref 70–110)
GLUCOSE BLD STRIP.AUTO-MCNC: 120 MG/DL (ref 70–110)
GLUCOSE BLD STRIP.AUTO-MCNC: 134 MG/DL (ref 70–110)

## 2018-09-10 PROCEDURE — 0UT70ZZ RESECTION OF BILATERAL FALLOPIAN TUBES, OPEN APPROACH: ICD-10-PCS | Performed by: OBSTETRICS & GYNECOLOGY

## 2018-09-10 PROCEDURE — 76060000034 HC ANESTHESIA 1.5 TO 2 HR: Performed by: OBSTETRICS & GYNECOLOGY

## 2018-09-10 PROCEDURE — 76210000016 HC OR PH I REC 1 TO 1.5 HR: Performed by: OBSTETRICS & GYNECOLOGY

## 2018-09-10 PROCEDURE — 82962 GLUCOSE BLOOD TEST: CPT

## 2018-09-10 PROCEDURE — 74011000272 HC RX REV CODE- 272: Performed by: OBSTETRICS & GYNECOLOGY

## 2018-09-10 PROCEDURE — 74011250636 HC RX REV CODE- 250/636: Performed by: OBSTETRICS & GYNECOLOGY

## 2018-09-10 PROCEDURE — 74011000250 HC RX REV CODE- 250

## 2018-09-10 PROCEDURE — 74011000258 HC RX REV CODE- 258: Performed by: OBSTETRICS & GYNECOLOGY

## 2018-09-10 PROCEDURE — 0UT20ZZ RESECTION OF BILATERAL OVARIES, OPEN APPROACH: ICD-10-PCS | Performed by: OBSTETRICS & GYNECOLOGY

## 2018-09-10 PROCEDURE — 77030037875 HC DRSG MEPILEX <16IN BORD MOLN -A: Performed by: OBSTETRICS & GYNECOLOGY

## 2018-09-10 PROCEDURE — 77030020782 HC GWN BAIR PAWS FLX 3M -B: Performed by: OBSTETRICS & GYNECOLOGY

## 2018-09-10 PROCEDURE — 77030014650 HC SEAL MTRX FLOSEL BAXT -C: Performed by: OBSTETRICS & GYNECOLOGY

## 2018-09-10 PROCEDURE — 77030006643: Performed by: ANESTHESIOLOGY

## 2018-09-10 PROCEDURE — 77030038020 HC MANFLD NEPTUNE STRY -B: Performed by: OBSTETRICS & GYNECOLOGY

## 2018-09-10 PROCEDURE — 74011250637 HC RX REV CODE- 250/637: Performed by: OBSTETRICS & GYNECOLOGY

## 2018-09-10 PROCEDURE — 77030034849: Performed by: OBSTETRICS & GYNECOLOGY

## 2018-09-10 PROCEDURE — 77030032490 HC SLV COMPR SCD KNE COVD -B: Performed by: OBSTETRICS & GYNECOLOGY

## 2018-09-10 PROCEDURE — 74011250636 HC RX REV CODE- 250/636

## 2018-09-10 PROCEDURE — 65270000029 HC RM PRIVATE

## 2018-09-10 PROCEDURE — 77030027138 HC INCENT SPIROMETER -A

## 2018-09-10 PROCEDURE — 77030020407 HC IV BLD WRMR ST 3M -A: Performed by: ANESTHESIOLOGY

## 2018-09-10 PROCEDURE — 77030011278 HC ELECTRD LIG IMPT COVD -F: Performed by: OBSTETRICS & GYNECOLOGY

## 2018-09-10 PROCEDURE — 88331 PATH CONSLTJ SURG 1 BLK 1SPC: CPT | Performed by: OBSTETRICS & GYNECOLOGY

## 2018-09-10 PROCEDURE — 77030018832 HC SOL IRR H20 ICUM -A: Performed by: OBSTETRICS & GYNECOLOGY

## 2018-09-10 PROCEDURE — 77030011264 HC ELECTRD BLD EXT COVD -A: Performed by: OBSTETRICS & GYNECOLOGY

## 2018-09-10 PROCEDURE — 77030008683 HC TU ET CUF COVD -A: Performed by: ANESTHESIOLOGY

## 2018-09-10 PROCEDURE — 88305 TISSUE EXAM BY PATHOLOGIST: CPT

## 2018-09-10 PROCEDURE — 74011250636 HC RX REV CODE- 250/636: Performed by: ANESTHESIOLOGY

## 2018-09-10 PROCEDURE — 77030018836 HC SOL IRR NACL ICUM -A: Performed by: OBSTETRICS & GYNECOLOGY

## 2018-09-10 PROCEDURE — 77030002933 HC SUT MCRYL J&J -A: Performed by: OBSTETRICS & GYNECOLOGY

## 2018-09-10 PROCEDURE — 76010000153 HC OR TIME 1.5 TO 2 HR: Performed by: OBSTETRICS & GYNECOLOGY

## 2018-09-10 PROCEDURE — C9290 INJ, BUPIVACAINE LIPOSOME: HCPCS | Performed by: OBSTETRICS & GYNECOLOGY

## 2018-09-10 PROCEDURE — 77030008462 HC STPLR SKN PROX J&J -A: Performed by: OBSTETRICS & GYNECOLOGY

## 2018-09-10 PROCEDURE — 77030008477 HC STYL SATN SLP COVD -A: Performed by: ANESTHESIOLOGY

## 2018-09-10 PROCEDURE — 0DBW0ZZ EXCISION OF PERITONEUM, OPEN APPROACH: ICD-10-PCS | Performed by: OBSTETRICS & GYNECOLOGY

## 2018-09-10 RX ORDER — HEPARIN SODIUM 5000 [USP'U]/ML
5000 INJECTION, SOLUTION INTRAVENOUS; SUBCUTANEOUS ONCE
Status: COMPLETED | OUTPATIENT
Start: 2018-09-10 | End: 2018-09-10

## 2018-09-10 RX ORDER — KETOROLAC TROMETHAMINE 30 MG/ML
INJECTION, SOLUTION INTRAMUSCULAR; INTRAVENOUS AS NEEDED
Status: DISCONTINUED | OUTPATIENT
Start: 2018-09-10 | End: 2018-09-10 | Stop reason: HOSPADM

## 2018-09-10 RX ORDER — SODIUM CHLORIDE, SODIUM LACTATE, POTASSIUM CHLORIDE, CALCIUM CHLORIDE 600; 310; 30; 20 MG/100ML; MG/100ML; MG/100ML; MG/100ML
125 INJECTION, SOLUTION INTRAVENOUS CONTINUOUS
Status: DISCONTINUED | OUTPATIENT
Start: 2018-09-10 | End: 2018-09-12 | Stop reason: HOSPADM

## 2018-09-10 RX ORDER — INSULIN LISPRO 100 [IU]/ML
INJECTION, SOLUTION INTRAVENOUS; SUBCUTANEOUS
Status: DISCONTINUED | OUTPATIENT
Start: 2018-09-10 | End: 2018-09-12 | Stop reason: HOSPADM

## 2018-09-10 RX ORDER — DEXTROSE 50 % IN WATER (D50W) INTRAVENOUS SYRINGE
25-50 AS NEEDED
Status: DISCONTINUED | OUTPATIENT
Start: 2018-09-10 | End: 2018-09-10 | Stop reason: HOSPADM

## 2018-09-10 RX ORDER — MIDAZOLAM HYDROCHLORIDE 1 MG/ML
INJECTION, SOLUTION INTRAMUSCULAR; INTRAVENOUS AS NEEDED
Status: DISCONTINUED | OUTPATIENT
Start: 2018-09-10 | End: 2018-09-10 | Stop reason: HOSPADM

## 2018-09-10 RX ORDER — ALPRAZOLAM 0.5 MG/1
2 TABLET ORAL
Status: DISCONTINUED | OUTPATIENT
Start: 2018-09-10 | End: 2018-09-12 | Stop reason: HOSPADM

## 2018-09-10 RX ORDER — SODIUM CHLORIDE 0.9 % (FLUSH) 0.9 %
5-10 SYRINGE (ML) INJECTION EVERY 8 HOURS
Status: DISCONTINUED | OUTPATIENT
Start: 2018-09-10 | End: 2018-09-12 | Stop reason: HOSPADM

## 2018-09-10 RX ORDER — SODIUM CHLORIDE 0.9 % (FLUSH) 0.9 %
5-10 SYRINGE (ML) INJECTION AS NEEDED
Status: DISCONTINUED | OUTPATIENT
Start: 2018-09-10 | End: 2018-09-10 | Stop reason: HOSPADM

## 2018-09-10 RX ORDER — ENOXAPARIN SODIUM 100 MG/ML
40 INJECTION SUBCUTANEOUS EVERY 24 HOURS
Status: DISCONTINUED | OUTPATIENT
Start: 2018-09-11 | End: 2018-09-12 | Stop reason: HOSPADM

## 2018-09-10 RX ORDER — EPHEDRINE SULFATE/0.9% NACL/PF 25 MG/5 ML
SYRINGE (ML) INTRAVENOUS AS NEEDED
Status: DISCONTINUED | OUTPATIENT
Start: 2018-09-10 | End: 2018-09-10 | Stop reason: HOSPADM

## 2018-09-10 RX ORDER — SODIUM CHLORIDE 9 MG/ML
250 INJECTION, SOLUTION INTRAVENOUS AS NEEDED
Status: DISCONTINUED | OUTPATIENT
Start: 2018-09-10 | End: 2018-09-10 | Stop reason: SDUPTHER

## 2018-09-10 RX ORDER — DEXTROSE 50 % IN WATER (D50W) INTRAVENOUS SYRINGE
25-50 AS NEEDED
Status: DISCONTINUED | OUTPATIENT
Start: 2018-09-10 | End: 2018-09-12 | Stop reason: HOSPADM

## 2018-09-10 RX ORDER — GLYCOPYRROLATE 0.2 MG/ML
INJECTION INTRAMUSCULAR; INTRAVENOUS AS NEEDED
Status: DISCONTINUED | OUTPATIENT
Start: 2018-09-10 | End: 2018-09-10 | Stop reason: HOSPADM

## 2018-09-10 RX ORDER — SODIUM CHLORIDE 9 MG/ML
250 INJECTION, SOLUTION INTRAVENOUS AS NEEDED
Status: DISCONTINUED | OUTPATIENT
Start: 2018-09-10 | End: 2018-09-10 | Stop reason: HOSPADM

## 2018-09-10 RX ORDER — LIDOCAINE HYDROCHLORIDE 20 MG/ML
INJECTION, SOLUTION EPIDURAL; INFILTRATION; INTRACAUDAL; PERINEURAL AS NEEDED
Status: DISCONTINUED | OUTPATIENT
Start: 2018-09-10 | End: 2018-09-10 | Stop reason: HOSPADM

## 2018-09-10 RX ORDER — PROPOFOL 10 MG/ML
INJECTION, EMULSION INTRAVENOUS AS NEEDED
Status: DISCONTINUED | OUTPATIENT
Start: 2018-09-10 | End: 2018-09-10 | Stop reason: HOSPADM

## 2018-09-10 RX ORDER — HYDROXYZINE HYDROCHLORIDE 10 MG/1
10 TABLET, FILM COATED ORAL 3 TIMES DAILY
Status: DISCONTINUED | OUTPATIENT
Start: 2018-09-10 | End: 2018-09-12 | Stop reason: HOSPADM

## 2018-09-10 RX ORDER — MAGNESIUM SULFATE 100 %
4 CRYSTALS MISCELLANEOUS AS NEEDED
Status: DISCONTINUED | OUTPATIENT
Start: 2018-09-10 | End: 2018-09-12 | Stop reason: HOSPADM

## 2018-09-10 RX ORDER — SODIUM CHLORIDE 0.9 % (FLUSH) 0.9 %
5-10 SYRINGE (ML) INJECTION AS NEEDED
Status: DISCONTINUED | OUTPATIENT
Start: 2018-09-10 | End: 2018-09-12 | Stop reason: HOSPADM

## 2018-09-10 RX ORDER — CEFAZOLIN SODIUM/WATER 2 G/20 ML
2 SYRINGE (ML) INTRAVENOUS EVERY 8 HOURS
Status: COMPLETED | OUTPATIENT
Start: 2018-09-10 | End: 2018-09-11

## 2018-09-10 RX ORDER — ONDANSETRON 2 MG/ML
4 INJECTION INTRAMUSCULAR; INTRAVENOUS
Status: DISCONTINUED | OUTPATIENT
Start: 2018-09-10 | End: 2018-09-12 | Stop reason: HOSPADM

## 2018-09-10 RX ORDER — NALOXONE HYDROCHLORIDE 0.4 MG/ML
0.1 INJECTION, SOLUTION INTRAMUSCULAR; INTRAVENOUS; SUBCUTANEOUS AS NEEDED
Status: DISCONTINUED | OUTPATIENT
Start: 2018-09-10 | End: 2018-09-10 | Stop reason: HOSPADM

## 2018-09-10 RX ORDER — FENTANYL CITRATE 50 UG/ML
INJECTION, SOLUTION INTRAMUSCULAR; INTRAVENOUS AS NEEDED
Status: DISCONTINUED | OUTPATIENT
Start: 2018-09-10 | End: 2018-09-10 | Stop reason: HOSPADM

## 2018-09-10 RX ORDER — ROCURONIUM BROMIDE 10 MG/ML
INJECTION, SOLUTION INTRAVENOUS AS NEEDED
Status: DISCONTINUED | OUTPATIENT
Start: 2018-09-10 | End: 2018-09-10 | Stop reason: HOSPADM

## 2018-09-10 RX ORDER — ATORVASTATIN CALCIUM 20 MG/1
20 TABLET, FILM COATED ORAL DAILY
Status: DISCONTINUED | OUTPATIENT
Start: 2018-09-11 | End: 2018-09-12 | Stop reason: HOSPADM

## 2018-09-10 RX ORDER — KETOROLAC TROMETHAMINE 15 MG/ML
15 INJECTION, SOLUTION INTRAMUSCULAR; INTRAVENOUS
Status: ACTIVE | OUTPATIENT
Start: 2018-09-10 | End: 2018-09-11

## 2018-09-10 RX ORDER — KETAMINE HYDROCHLORIDE 10 MG/ML
INJECTION, SOLUTION INTRAMUSCULAR; INTRAVENOUS AS NEEDED
Status: DISCONTINUED | OUTPATIENT
Start: 2018-09-10 | End: 2018-09-10 | Stop reason: HOSPADM

## 2018-09-10 RX ORDER — FLUMAZENIL 0.1 MG/ML
0.2 INJECTION INTRAVENOUS
Status: DISCONTINUED | OUTPATIENT
Start: 2018-09-10 | End: 2018-09-10 | Stop reason: HOSPADM

## 2018-09-10 RX ORDER — DIPHENHYDRAMINE HCL 25 MG
25 CAPSULE ORAL
Status: DISCONTINUED | OUTPATIENT
Start: 2018-09-10 | End: 2018-09-12 | Stop reason: HOSPADM

## 2018-09-10 RX ORDER — NALOXONE HYDROCHLORIDE 0.4 MG/ML
0.4 INJECTION, SOLUTION INTRAMUSCULAR; INTRAVENOUS; SUBCUTANEOUS AS NEEDED
Status: DISCONTINUED | OUTPATIENT
Start: 2018-09-10 | End: 2018-09-12 | Stop reason: HOSPADM

## 2018-09-10 RX ORDER — MAGNESIUM SULFATE 100 %
4 CRYSTALS MISCELLANEOUS AS NEEDED
Status: DISCONTINUED | OUTPATIENT
Start: 2018-09-10 | End: 2018-09-10 | Stop reason: HOSPADM

## 2018-09-10 RX ORDER — INSULIN LISPRO 100 [IU]/ML
INJECTION, SOLUTION INTRAVENOUS; SUBCUTANEOUS ONCE
Status: DISCONTINUED | OUTPATIENT
Start: 2018-09-10 | End: 2018-09-10 | Stop reason: HOSPADM

## 2018-09-10 RX ORDER — DEXAMETHASONE SODIUM PHOSPHATE 4 MG/ML
INJECTION, SOLUTION INTRA-ARTICULAR; INTRALESIONAL; INTRAMUSCULAR; INTRAVENOUS; SOFT TISSUE AS NEEDED
Status: DISCONTINUED | OUTPATIENT
Start: 2018-09-10 | End: 2018-09-10 | Stop reason: HOSPADM

## 2018-09-10 RX ORDER — NEOSTIGMINE METHYLSULFATE 5 MG/5 ML
SYRINGE (ML) INTRAVENOUS AS NEEDED
Status: DISCONTINUED | OUTPATIENT
Start: 2018-09-10 | End: 2018-09-10 | Stop reason: HOSPADM

## 2018-09-10 RX ORDER — FENTANYL CITRATE 50 UG/ML
50 INJECTION, SOLUTION INTRAMUSCULAR; INTRAVENOUS
Status: DISCONTINUED | OUTPATIENT
Start: 2018-09-10 | End: 2018-09-10 | Stop reason: HOSPADM

## 2018-09-10 RX ORDER — SODIUM CHLORIDE, SODIUM LACTATE, POTASSIUM CHLORIDE, CALCIUM CHLORIDE 600; 310; 30; 20 MG/100ML; MG/100ML; MG/100ML; MG/100ML
1000 INJECTION, SOLUTION INTRAVENOUS CONTINUOUS
Status: DISCONTINUED | OUTPATIENT
Start: 2018-09-10 | End: 2018-09-10 | Stop reason: HOSPADM

## 2018-09-10 RX ORDER — ONDANSETRON 2 MG/ML
INJECTION INTRAMUSCULAR; INTRAVENOUS AS NEEDED
Status: DISCONTINUED | OUTPATIENT
Start: 2018-09-10 | End: 2018-09-10 | Stop reason: HOSPADM

## 2018-09-10 RX ORDER — CEFAZOLIN SODIUM/WATER 2 G/20 ML
2 SYRINGE (ML) INTRAVENOUS ONCE
Status: COMPLETED | OUTPATIENT
Start: 2018-09-10 | End: 2018-09-10

## 2018-09-10 RX ORDER — PHENYLEPHRINE HCL IN 0.9% NACL 1 MG/10 ML
SYRINGE (ML) INTRAVENOUS AS NEEDED
Status: DISCONTINUED | OUTPATIENT
Start: 2018-09-10 | End: 2018-09-10 | Stop reason: HOSPADM

## 2018-09-10 RX ADMIN — DEXAMETHASONE SODIUM PHOSPHATE 4 MG: 4 INJECTION, SOLUTION INTRA-ARTICULAR; INTRALESIONAL; INTRAMUSCULAR; INTRAVENOUS; SOFT TISSUE at 12:52

## 2018-09-10 RX ADMIN — FENTANYL CITRATE 50 MCG: 50 INJECTION, SOLUTION INTRAMUSCULAR; INTRAVENOUS at 12:58

## 2018-09-10 RX ADMIN — SODIUM CHLORIDE, SODIUM LACTATE, POTASSIUM CHLORIDE, AND CALCIUM CHLORIDE 125 ML/HR: 600; 310; 30; 20 INJECTION, SOLUTION INTRAVENOUS at 11:25

## 2018-09-10 RX ADMIN — HEPARIN SODIUM 5000 UNITS: 5000 INJECTION, SOLUTION INTRAVENOUS; SUBCUTANEOUS at 11:28

## 2018-09-10 RX ADMIN — Medication 10 MG: at 13:07

## 2018-09-10 RX ADMIN — PROPOFOL 200 MG: 10 INJECTION, EMULSION INTRAVENOUS at 12:23

## 2018-09-10 RX ADMIN — FENTANYL CITRATE 50 MCG: 50 INJECTION, SOLUTION INTRAMUSCULAR; INTRAVENOUS at 14:52

## 2018-09-10 RX ADMIN — ROCURONIUM BROMIDE 50 MG: 10 INJECTION, SOLUTION INTRAVENOUS at 12:23

## 2018-09-10 RX ADMIN — KETAMINE HYDROCHLORIDE 50 MG: 10 INJECTION, SOLUTION INTRAMUSCULAR; INTRAVENOUS at 13:00

## 2018-09-10 RX ADMIN — MIDAZOLAM HYDROCHLORIDE 2 MG: 1 INJECTION, SOLUTION INTRAMUSCULAR; INTRAVENOUS at 12:19

## 2018-09-10 RX ADMIN — SODIUM CHLORIDE, SODIUM LACTATE, POTASSIUM CHLORIDE, AND CALCIUM CHLORIDE: 600; 310; 30; 20 INJECTION, SOLUTION INTRAVENOUS at 13:13

## 2018-09-10 RX ADMIN — Medication 100 MCG: at 12:33

## 2018-09-10 RX ADMIN — HYDROMORPHONE HYDROCHLORIDE: 10 INJECTION, SOLUTION INTRAMUSCULAR; INTRAVENOUS; SUBCUTANEOUS at 14:54

## 2018-09-10 RX ADMIN — Medication 2 G: at 20:04

## 2018-09-10 RX ADMIN — LIDOCAINE HYDROCHLORIDE 100 MG: 20 INJECTION, SOLUTION EPIDURAL; INFILTRATION; INTRACAUDAL; PERINEURAL at 12:23

## 2018-09-10 RX ADMIN — Medication 2 G: at 12:29

## 2018-09-10 RX ADMIN — FENTANYL CITRATE 50 MCG: 50 INJECTION, SOLUTION INTRAMUSCULAR; INTRAVENOUS at 13:24

## 2018-09-10 RX ADMIN — HYDROXYZINE HYDROCHLORIDE 10 MG: 10 TABLET, FILM COATED ORAL at 18:20

## 2018-09-10 RX ADMIN — ONDANSETRON 4 MG: 2 INJECTION INTRAMUSCULAR; INTRAVENOUS at 12:52

## 2018-09-10 RX ADMIN — FENTANYL CITRATE 50 MCG: 50 INJECTION, SOLUTION INTRAMUSCULAR; INTRAVENOUS at 13:09

## 2018-09-10 RX ADMIN — SODIUM CHLORIDE, SODIUM LACTATE, POTASSIUM CHLORIDE, AND CALCIUM CHLORIDE 125 ML/HR: 600; 310; 30; 20 INJECTION, SOLUTION INTRAVENOUS at 19:36

## 2018-09-10 RX ADMIN — Medication 3 MG: at 13:52

## 2018-09-10 RX ADMIN — ONDANSETRON 4 MG: 2 INJECTION, SOLUTION INTRAMUSCULAR; INTRAVENOUS at 20:27

## 2018-09-10 RX ADMIN — GLYCOPYRROLATE 0.4 MG: 0.2 INJECTION INTRAMUSCULAR; INTRAVENOUS at 13:52

## 2018-09-10 RX ADMIN — ROCURONIUM BROMIDE 20 MG: 10 INJECTION, SOLUTION INTRAVENOUS at 13:04

## 2018-09-10 RX ADMIN — FENTANYL CITRATE 50 MCG: 50 INJECTION, SOLUTION INTRAMUSCULAR; INTRAVENOUS at 14:44

## 2018-09-10 RX ADMIN — SODIUM CHLORIDE, SODIUM LACTATE, POTASSIUM CHLORIDE, AND CALCIUM CHLORIDE: 600; 310; 30; 20 INJECTION, SOLUTION INTRAVENOUS at 12:44

## 2018-09-10 RX ADMIN — FENTANYL CITRATE 100 MCG: 50 INJECTION, SOLUTION INTRAMUSCULAR; INTRAVENOUS at 12:23

## 2018-09-10 RX ADMIN — KETOROLAC TROMETHAMINE 30 MG: 30 INJECTION, SOLUTION INTRAMUSCULAR; INTRAVENOUS at 13:43

## 2018-09-10 NOTE — ANESTHESIA POSTPROCEDURE EVALUATION
Post-Anesthesia Evaluation and Assessment Cardiovascular Function/Vital Signs Visit Vitals  /61  Pulse 68  Temp 36.9 °C (98.5 °F)  Resp 11  
 Ht 5' 7.5\" (1.715 m)  Wt 80.4 kg (177 lb 5 oz)  SpO2 98%  BMI 27.36 kg/m2 Patient is status post Procedure(s): LAPAROTOMY EXPLORATORY, RESECTION OF PELVIC MASS BILATERAL SALPINGO- OOPHORECTOMY, \"SPEC POP\"  . Nausea/Vomiting: Controlled. Postoperative hydration reviewed and adequate. Pain: 
Pain Scale 1: FLACC (09/10/18 1501) Pain Intensity 1: 0 (09/10/18 1501) Managed. Neurological Status:  
Neuro (WDL): Within Defined Limits (09/10/18 1117) At baseline. Mental Status and Level of Consciousness: Arousable. Pulmonary Status:  
O2 Device: Nasal cannula (09/10/18 1445) Adequate oxygenation and airway patent. Complications related to anesthesia: None Post-anesthesia assessment completed. No concerns. Patient has met all discharge requirements. Signed By: Bushra Barrett CRNA September 10, 2018

## 2018-09-10 NOTE — PROGRESS NOTES
TRANSFER - IN REPORT:    Bedside report received from [facility] on [facility]  being received from PACU for routine post - op      Report consisted of patients Situation, Background, Assessment and   Recommendations(SBAR). Information from the following report(s) SBAR, Kardex, Procedure Summary, Intake/Output, MAR and Recent Results was reviewed with the receiving nurse. Opportunity for questions and clarification was provided.

## 2018-09-10 NOTE — PERIOP NOTES
TRANSFER - OUT REPORT:    bedside report given to Juventino Sanchez RN(name) on Roberto Gusman  being transferred to (unit) for routine post - op       Report consisted of patients Situation, Background, Assessment and   Recommendations(SBAR). Information from the following report(s) SBAR, Kardex, OR Summary, Procedure Summary, Intake/Output and MAR was reviewed with the receiving nurse. Lines:   Peripheral IV 09/10/18 Left Hand (Active)   Site Assessment Clean, dry, & intact 9/10/2018  2:47 PM   Phlebitis Assessment 0 9/10/2018  2:47 PM   Infiltration Assessment 0 9/10/2018  2:47 PM   Dressing Status Clean, dry, & intact 9/10/2018  2:47 PM   Dressing Type Transparent;Tape 9/10/2018  2:47 PM   Hub Color/Line Status Infusing 9/10/2018  2:47 PM   Action Taken Armboard 9/10/2018 12:52 PM   Alcohol Cap Used No 9/10/2018 11:26 AM        Opportunity for questions and clarification was provided.       Patient transported with:   Registered Nurse  Tech

## 2018-09-10 NOTE — ANESTHESIA PREPROCEDURE EVALUATION
Anesthetic History No history of anesthetic complications Review of Systems / Medical History Patient summary reviewed, nursing notes reviewed and pertinent labs reviewed Pulmonary Within defined limits Neuro/Psych Within defined limits Cardiovascular Within defined limits Exercise tolerance: >4 METS 
  
GI/Hepatic/Renal 
Within defined limits Endo/Other Diabetes: well controlled Arthritis Other Findings Physical Exam 
 
Airway Cardiovascular Dental 
 
 
  
Pulmonary Abdominal 
GI exam deferred Other Findings Anesthetic Plan ASA: 2 Anesthesia type: general 
 
 
 
 
Induction: Intravenous

## 2018-09-10 NOTE — BRIEF OP NOTE
BRIEF OPERATIVE NOTE    Date of Procedure: 9/10/2018   Preoperative Diagnosis: PELVIC MASS  Postoperative Diagnosis: PELVIC MASS    Procedure(s):  LAPAROTOMY EXPLORATORY, RESECTION OF PELVIC MASS BILATERAL SALPINGO- OOPHORECTOMY, \"SPEC POP\"    Surgeon(s) and Role:     * Maria D Soto MD - Primary    Surgical Staff:  Circ-1: Dee Dee Nuñez  Circ-2: Karla Beltre RN  Scrub Tech-1: Travon Schuler  Surg Asst-Relief: Thena Billie  Event Time In   Incision Start 1248   Incision Close 7683     Anesthesia: General   Estimated Blood Loss: 200  Specimens:   ID Type Source Tests Collected by Time Destination   1 : PELVIC MASS Frozen Section Pelvis  Maira D Soto MD 9/10/2018 1318 Pathology   2 : LEFT FALLOPIAN TUBE, LEFT OVARY Preservative Ovary  Maria D Soto MD 9/10/2018 1322 Pathology      Findings: large abdominopelvic mass filling pelvis extending down to the levators adhered to ureter on right and bladder anteriorly   Complications: none  Implants: * No implants in log *

## 2018-09-10 NOTE — IP AVS SNAPSHOT
[address] [facility] 
 
 
 [address] [address] 
[phone] Patient: Zafar Mitchell MRN: [id number]:[id number] About your hospitalization You were admitted on:  September 10, 2018 You last received care in the:  [address] [place] [address] You were discharged on:  September 12, 2018 Why you were hospitalized Your primary diagnosis was:  Not on File Your diagnoses also included:  Pelvic Mass In Female Follow-up Information Follow up With Details Comments Contact Info Jesi Hendrickson MD On 9/19/2018 Follow up appointment scheduled for September 19, 2018 at 1:00 p.m. for staple removal. [address] [facility] [address] [address]  [address] 
[phone] Jesi Hendrickson MD On 10/8/2018 Follow up appointment scheduled for October 8, 2018 at 10:00 a.m. for f/u appointment. [address] [address]  [address] 
[phone] Abraham Stephenson MD   Patient can only remember the practice name and not the physician Your Scheduled Appointments Wednesday September 19, 2018  1:00 PM EDT Follow Up with Majo Louis NP [place] [employer] [facility] [address])  
 [facility] [address] [address]  
[phone] Monday October 08, 2018 10:00 AM EDT  
POST OP with Majo Louis NP [facility] [address])  
 [facility] [address] [address]  
[phone] Discharge Orders None A check huong indicates which time of day the medication should be taken. My Medications START taking these medications Instructions Each Dose to Equal  
 Morning Noon Evening Bedtime HYDROmorphone 2 mg tablet Commonly known as:  DILAUDID Your last dose was: Your next dose is: Take 1-2 Tabs by mouth every four (4) hours as needed.  Max Daily Amount: 24 mg.  
 2-4 mg  
    
   
   
   
 CONTINUE taking these medications Instructions Each Dose to Equal  
 Morning Noon Evening Bedtime  
 atorvastatin 20 mg tablet Commonly known as:  LIPITOR Your last dose was: Your next dose is: TAKE 1 TABLET BY MOUTH bedtime  
     
   
   
   
  
 clobetasol 0.05 % ointment Commonly known as:  Oneida Yuly Your last dose was: Your next dose is:    
   
   
 Apply  to affected area. diclofenac EC 75 mg EC tablet Commonly known as:  VOLTAREN Your last dose was: Your next dose is: Take  by mouth two (2) times daily as needed. ENBREL 50 mg/mL (0.98 mL) injection Generic drug:  etanercept Your last dose was: Your next dose is:    
   
   
 by SubCUTAneous route Every Thursday. hydrOXYzine HCl 10 mg tablet Commonly known as:  ATARAX Your last dose was: Your next dose is: Take 10 mg by mouth. Indications: psoriasis 10 mg  
    
   
   
   
  
 metFORMIN 500 mg tablet Commonly known as:  GLUCOPHAGE Your last dose was: Your next dose is: Take 500 mg by mouth nightly. Indications: type 2 diabetes mellitus 500 mg PROBIOTIC 4X PO Your last dose was: Your next dose is: Take 1 Tab by mouth. 1 Tab  
    
   
   
   
  
 traZODone 150 mg tablet Commonly known as:  Melvin Lipps Your last dose was: Your next dose is:    
   
   
 1 po qhs  
     
   
   
   
  
 urea 40 % topical cream  
Commonly known as:  CARMOL Your last dose was: Your next dose is:    
   
   
 NAY MARBLE SIZED AMOUNT TO THICKENED AREAS ON BODY ONCE TO BID PRN  
     
   
   
   
  
 XANAX 2 mg tablet Generic drug:  ALPRAZolam  
   
Your last dose was: Your next dose is:    
   
   
 1 tablet by mouth every 8 hours as needed for severe anxiety Where to Get Your Medications Information on where to get these meds will be given to you by the nurse or doctor. ! Ask your nurse or doctor about these medications HYDROmorphone 2 mg tablet Opioid Education Prescription Opioids: What You Need to Know: 
 
Prescription opioids can be used to help relieve moderate-to-severe pain and are often prescribed following a surgery or injury, or for certain health conditions. These medications can be an important part of treatment but also come with serious risks. Opioids are strong pain medicines. Examples include hydrocodone, oxycodone, fentanyl, and morphine. Heroin is an example of an illegal opioid. It is important to work with your health care provider to make sure you are getting the safest, most effective care. WHAT ARE THE RISKS AND SIDE EFFECTS OF OPIOID USE? Prescription opioids carry serious risks of addiction and overdose, especially with prolonged use. An opioid overdose, often marked by slow breathing, can cause sudden death. The use of prescription opioids can have a number of side effects as well, even when taken as directed. · Tolerance-meaning you might need to take more of a medication for the same pain relief · Physical dependence-meaning you have symptoms of withdrawal when the medication is stopped. Withdrawal symptoms can include nausea, sweating, chills, diarrhea, stomach cramps, and muscle aches. Withdrawal can last up to several weeks, depending on which drug you took and how long you took it. · Increased sensitivity to pain · Constipation · Nausea, vomiting, and dry mouth · Sleepiness and dizziness · Confusion · Depression · Low levels of testosterone that can result in lower sex drive, energy, and strength · Itching and sweating RISKS ARE GREATER WITH:      
· History of drug misuse, substance use disorder, or overdose · Mental health conditions (such as depression or anxiety) · Sleep apnea · Older age (72 years or older) · Pregnancy Avoid alcohol while taking prescription opioids. Also, unless specifically advised by your health care provider, medications to avoid include: · Benzodiazepines (such as Xanax or Valium) · Muscle relaxants (such as Soma or Flexeril) · Hypnotics (such as Ambien or Lunesta) · Other prescription opioids KNOW YOUR OPTIONS Talk to your health care provider about ways to manage your pain that don't involve prescription opioids. Some of these options may actually work better and have fewer risks and side effects. Options may include: 
· Pain relievers such as acetaminophen, ibuprofen, and naproxen · Some medications that are also used for depression or seizures · Physical therapy and exercise · Counseling to help patients learn how to cope better with triggers of pain and stress. · Application of heat or cold compress · Massage therapy · Relaxation techniques Be Informed Make sure you know the name of your medication, how much and how often to take it, and its potential risks & side effects. IF YOU ARE PRESCRIBED OPIOIDS FOR PAIN: 
· Never take opioids in greater amounts or more often than prescribed. Remember the goal is not to be pain-free but to manage your pain at a tolerable level. · Follow up with your primary care provider to: · Work together to create a plan on how to manage your pain. · Talk about ways to help manage your pain that don't involve prescription opioids. · Talk about any and all concerns and side effects. · Help prevent misuse and abuse. · Never sell or share prescription opioids · Help prevent misuse and abuse. · Store prescription opioids in a secure place and out of reach of others (this may include visitors, children, friends, and family).  
· Safely dispose of unused/unwanted prescription opioids: Find your community drug take-back program or your pharmacy mail-back program, or flush them down the toilet, following guidance from the Food and Drug Administration (www.fda.gov/Drugs/ResourcesForYou). · Visit www.cdc.gov/drugoverdose to learn about the risks of opioid abuse and overdose. · If you believe you may be struggling with addiction, tell your health care provider and ask for guidance or call [facility] at 8-859-386-GPAZ. Discharge Instructions Acute Pain After Surgery: Care Instructions Your Care Instructions It's common to have some pain after surgery. Pain doesn't mean that something is wrong or that the surgery didn't go well. But when the pain is severe, it's important to work with your doctor to manage it. It's also important to be aware of a few facts about pain and pain medicine. · You are the only person who knows what your pain feels like. So be sure to tell your doctor when you are in pain or when the pain changes. Then he or she will know how to adjust your medicines. · Pain is often easier to control right after it starts. So it may be better to take regular doses of pain medicine and not wait until the pain gets bad. · Medicine can help control pain. But this doesn't mean you'll have no pain. Medicine works to keep the pain at a level you can live with. With time, you will feel better. Follow-up care is a key part of your treatment and safety. Be sure to make and go to all appointments, and call your doctor if you are having problems. It's also a good idea to know your test results and keep a list of the medicines you take. How can you care for yourself at home? · Be safe with medicines. Read and follow all instructions on the label. ¨ If the doctor gave you a prescription medicine for pain, take it as prescribed.  
¨ If you are not taking a prescription pain medicine, ask your doctor if you can take an over-the-counter medicine. · If you take an over-the-counter pain medicine, such as acetaminophen (Tylenol), ibuprofen (Advil, Motrin), or naproxen (Aleve), read and follow all instructions on the label. · Do not take two or more pain medicines at the same time unless the doctor told you to. · Do not drink alcohol while you are taking pain medicines. · Try to walk each day if your doctor recommends it. Start by walking a little more than you did the day before. Bit by bit, increase the amount you walk. Walking increases blood flow. It also helps prevent pneumonia and constipation. · To prevent constipation from opioid pain medicines: ¨ Talk to your doctor about a laxative. ¨ Include fruits, vegetables, beans, and whole grains in your diet each day. These foods are high in fiber. ¨ Drink plenty of fluids, enough so that your urine is light yellow or clear like water. Drink water, fruit juice, or other drinks that do not contain caffeine or alcohol. If you have kidney, heart, or liver disease and have to limit fluids, talk with your doctor before you increase the amount of fluids you drink. ¨ Take a fiber supplement, such as Citrucel or Metamucil, every day if needed. Read and follow all instructions on the label. If you take pain medicine for more than a few days, talk to your doctor before you take fiber. When should you call for help? Call your doctor now or seek immediate medical care if: 
  · Your pain gets worse.  
  · Your pain is not controlled by medicine.  
 Watch closely for changes in your health, and be sure to contact your doctor if you have any problems. Where can you learn more? Go to http://sujit-colleen.info/. Enter (44) 300-051 in the search box to learn more about \"Acute Pain After Surgery: Care Instructions. \" Current as of: November 20, 2017 Content Version: 11.7 © 6573-7231 [employer].  Care instructions adapted under license by [address] (which disclaims liability or warranty for this information). If you have questions about a medical condition or this instruction, always ask your healthcare professional. [address] any warranty or liability for your use of this information. DISCHARGE SUMMARY from Nurse PATIENT INSTRUCTIONS: 
 
 
F-face looks uneven A-arms unable to move or move unevenly S-speech slurred or non-existent T-time-call 911 as soon as signs and symptoms begin-DO NOT go Back to bed or wait to see if you get better-TIME IS BRAIN. Warning Signs of HEART ATTACK Call 911 if you have these symptoms: 
? Chest discomfort. Most heart attacks involve discomfort in the center of the chest that lasts more than a few minutes, or that goes away and comes back. It can feel like uncomfortable pressure, squeezing, fullness, or pain. ? Discomfort in other areas of the upper body. Symptoms can include pain or discomfort in one or both arms, the back, neck, jaw, or stomach. ? Shortness of breath with or without chest discomfort. ? Other signs may include breaking out in a cold sweat, nausea, or lightheadedness. Don't wait more than five minutes to call [address]! Fast action can save your life. Calling 911 is almost always the fastest way to get lifesaving treatment. Emergency Medical Services staff can begin treatment when they arrive  up to an hour sooner than if someone gets to the hospital by car. The discharge information has been reviewed with the patient. The patient verbalized understanding. Discharge medications reviewed with the patient and appropriate educational materials and side effects teaching were provided.  
___________________________________________________________________________ ________________________________________________________ Introducing [facility]! Dear Elizabeth Trujillo: 
Thank you for requesting a [employer] account. Our records indicate that you already have an active [employer] account. You can access your account anytime at [email or web address] 1-4 [email or web address] Did you know that you can access your hospital and ER discharge instructions at any time in [employer]? You can also review all of your test results from your hospital stay or ER visit. Additional Information If you have questions, please visit the Frequently Asked Questions section of the [employer] website at [email or web address] Remember, [employer] is NOT to be used for urgent needs. For medical emergencies, dial 911. Now available from your iPhone and Android! Introducing Carlin Radford As a [facility] patient, I wanted to make you aware of our electronic visit tool called Carlin Radford. [facility] 24/7 allows you to connect within minutes with a medical provider 24 hours a day, seven days a week via a mobile device or tablet or logging into a secure website from your computer. You can access Carlin Radford from anywhere in the [place]. A virtual visit might be right for you when you have a simple condition and feel like you just dont want to get out of bed, or cant get away from work for an appointment, when your regular [facility] provider is not available (evenings, weekends or holidays), or when youre out of town and need minor care. Electronic visits cost only $49 and if the [facility] 24/7 provider determines a prescription is needed to treat your condition, one can be electronically transmitted to a nearby pharmacy*. Please take a moment to enroll today if you have not already done so. The enrollment process is free and takes just a few minutes.   To enroll, please download the [facility] 24/7 tariq to your tablet or phone, or visit [email or web address] to enroll on your computer. And, as an [address] [place] [address] patient with a [employer] account, the results of your visits will be scanned into your electronic medical record and your primary care provider will be able to view the scanned results. We urge you to continue to see your regular [facility] provider for your ongoing medical care. And while your primary care provider may not be the one available when you seek a [facility] virtual visit, the peace of mind you get from getting a real diagnosis real time can be priceless. For more information on [facility], view our Frequently Asked Questions (FAQs) at [email or web address]. Sincerely, 
 
Germán Malone MD 
Chief Medical Officer [employer] *:  certain medications cannot be prescribed via [facility] Providers Seen During Your Hospitalization Provider Specialty Primary office phone Brenda Bobo MD Gynecologic Oncology [phone] Your Primary Care Physician (PCP) Primary Care Physician Office Phone Office Fax OTHER, PHYS ** None ** ** None ** You are allergic to the following Allergen Reactions Bee Venom Protein (Honey Bee) Anaphylaxis Lemon Hives  
 fresh Vicodin (Hydrocodone-Acetaminophen) Hives Itching Swelling Recent Documentation Height Weight BMI OB Status Smoking Status 1.715 m 81.6 kg 27.76 kg/m2 Hysterectomy Former Smoker Emergency Contacts Name Discharge Info Relation Home Work Mobile [address] [facility] CAREGIVER [3] Spouse [3] [phone] [facility] DISCHARGE CAREGIVER [3] Friend [5] [phone] Patient Belongings  The following personal items are in your possession at time of discharge: 
  Dental Appliances:  (to be given to Malini Mueller)  Visual Aid: None      Home Medications: None   Jewelry: Ring, Necklace (x2 ring; 1 necklace with Filiberto)  Clothing: Footwear, Undergarments, Pants, Socks (with Lexii Talamantes)    Other Valuables: Wallet, Purse, Cell Phone (wtih Lexii Talamantes) Please provide this summary of care documentation to your next provider. Signatures-by signing, you are acknowledging that this After Visit Summary has been reviewed with you and you have received a copy. Patient Signature:  ____________________________________________________________ Date:  ____________________________________________________________  
  
[facility] Provider Signature:  ____________________________________________________________ Date:  ____________________________________________________________

## 2018-09-10 NOTE — IP AVS SNAPSHOT
[address] [address] 
[phone] Patient: Alexei Diallo MRN: [id number] TQE:[id number] A check huong indicates which time of day the medication should be taken. My Medications START taking these medications Instructions Each Dose to Equal  
 Morning Noon Evening Bedtime HYDROmorphone 2 mg tablet Commonly known as:  DILAUDID Your last dose was: Your next dose is: Take 1-2 Tabs by mouth every four (4) hours as needed. Max Daily Amount: 24 mg.  
 2-4 mg CONTINUE taking these medications Instructions Each Dose to Equal  
 Morning Noon Evening Bedtime  
 atorvastatin 20 mg tablet Commonly known as:  LIPITOR Your last dose was: Your next dose is: TAKE 1 TABLET BY MOUTH bedtime  
     
   
   
   
  
 clobetasol 0.05 % ointment Commonly known as:  Alvester Car Your last dose was: Your next dose is:    
   
   
 Apply  to affected area. diclofenac EC 75 mg EC tablet Commonly known as:  VOLTAREN Your last dose was: Your next dose is: Take  by mouth two (2) times daily as needed. ENBREL 50 mg/mL (0.98 mL) injection Generic drug:  etanercept Your last dose was: Your next dose is:    
   
   
 by SubCUTAneous route Every Thursday. hydrOXYzine HCl 10 mg tablet Commonly known as:  ATARAX Your last dose was: Your next dose is: Take 10 mg by mouth. Indications: psoriasis 10 mg  
    
   
   
   
  
 metFORMIN 500 mg tablet Commonly known as:  GLUCOPHAGE Your last dose was: Your next dose is: Take 500 mg by mouth nightly. Indications: type 2 diabetes mellitus 500 mg PROBIOTIC 4X PO Your last dose was: Your next dose is: Take 1 Tab by mouth. 1 Tab  
    
   
   
   
  
 traZODone 150 mg tablet Commonly known as:  John Tej Your last dose was: Your next dose is:    
   
   
 1 po qhs  
     
   
   
   
  
 urea 40 % topical cream  
Commonly known as:  CARMOL Your last dose was: Your next dose is:    
   
   
 NAY MARBLE SIZED AMOUNT TO THICKENED AREAS ON BODY ONCE TO BID PRN  
     
   
   
   
  
 XANAX 2 mg tablet Generic drug:  ALPRAZolam  
   
Your last dose was: Your next dose is:    
   
   
 1 tablet by mouth every 8 hours as needed for severe anxiety Where to Get Your Medications Information on where to get these meds will be given to you by the nurse or doctor. ! Ask your nurse or doctor about these medications HYDROmorphone 2 mg tablet

## 2018-09-10 NOTE — INTERVAL H&P NOTE
H&P Update: Kurt Yang was seen and examined. History and physical has been reviewed. The patient has been examined.  There have been no significant clinical changes since the completion of the originally dated History and Physical.    Signed By: Stalin Stearns MD     September 10, 2018 11:52 AM

## 2018-09-10 NOTE — H&P (VIEW-ONLY)
[address], [address] [facility] Norma Mitchell, [address] [facility] 
[address] [facility], [address] [place], [address] 
 [phone] Donna Sanchez DO Patient ID: 
Name:  Garth Klinefelter MRN:  [id number] :  1960/58 y.o. Date:  2018 HISTORY OF PRESENT ILLNESS: 
Garth Klinefelter is a 59 y.o.  postmenopausal female referred by Dr. Ivon Galindo for pelvic mass. Pt underwent  TRACHELECTOMY,ANTERIOR AND POSTERIOR REPAIR,SACROSPINOUS LIGAMENT SUSPENSION,CYSTOSCOPY on 2018 with dr. Ivon Galindo for prolapse and was found to have a large abdominopelvic mass. A CT was performed as below. 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 Labs: 
Pathology 2018 PELVIC MASS, CT-GUIDED CORE BIOPSY:  
HISTOLOGIC FINDINGS CONSISTENT WITH LEIOMYOMA. Pathology 2018 RIGHT LUNG MASS, CORE BIOPSIES:  
CONSISTENT WITH ADENOCARCINOMA OF BRONCHOGENIC ORIGIN. Component Latest Ref Rng & Units 2018  
 
      2:34 PM  2:34 PM  2:34 PM  
Cancer Ag (CA) 125 
    0.0 - 38.1 U/mL  18.6 Carbohydrate Antigen 19-9, (CA 19-9) 
    0 - 35 U/mL 5    
CEA 
    ng/mL   1.5 Imaging FINDINGS: 
  
LOWER CHEST: Partial visualized Right lower lobe 1.5 cm pulmonary nodule with 
irregular spiculated-type margins, as visualized on the first axial slice. No 
pericardial or pleural effusion. 
  
LIVER, BILIARY: No suspicious enhancing hepatic mass or lesion. Mild 
intrahepatic and extra ventricular duct dilatation, favoring post 
cholecystectomy reservoir artifact. 
  
PANCREAS: No suspicious enhancing mass or lesion. Within normal limits. 
  
SPLEEN: Normal in size and attenuation.  Rounded 7 mm splenule . 
  
ADRENALS: Normal. 
  
 KIDNEYS: Symmetric enhancing bilateral kidneys with mild right-sided 
hydronephrosis and hydroureter. No discrete left-sided hydronephrosis or left 
hydroureter. 
  
LYMPH NODES: Distal left periaortic 8mm AP dimension by 2.4 cm (S/I) tubular 
shaped structure (axial image 65/coronal image 57) which may represent an 
enlarged lymph node versus dilated vascular structure. 
  
  
GASTROINTESTINAL TRACT: No bowel dilation or wall thickening. 
  
PELVIC ORGANS, OTHER: Large multilobulated enhancing solid soft tissue pelvic 
mass extending to the mid abdomen, measuring 27.4 x 21.5 x 11.2 cm (S/I by 
transverse by AP). There is adjacent mass effect upon the bladder which is 
anteriorly displaced as well as the bowel loops. This mass abuts the cervical 
hysterectomy stump with asymmetric right pelvic extension. 
-Lateral left mid pelvic plaque-like soft tissue measuring 2.9 x 1.4 cm (image 82) with additional areas of subtle nodularity anterior left hemipelvis (image 77) and right lateral abdomen (image 66).   
VASCULATURE: Unremarkable. 
  
BONES: No acute or aggressive osseous abnormalities identified. 
  
OTHER: Right posterior inferior gluteal soft tissue enhancing mass measuring 1.5 
cm (axial image 132). . 
  
_______________ 
  
IMPRESSION IMPRESSION: 
  
1. Large soft tissue enhancing multilobulated mass arising from the lower 
pelvis, probably right adnexa extending to the mid abdomen, measuring 27 x 21 x 
11 cm, in a patient with prior hysterectomy. Differential considerations are 
highly concerning for ovarian neoplasm. Recommend gynecologic oncology 
consultation. 
  
  
2. Left lateral pelvic plaque-like soft tissue and right mid abdomen 5 mm 
nodule, concerning for peritoneal metastasis. 
  
3. Partial visualized right lower lobe spiculated 1.5 cm nodule, metastatic 
disease versus primary pulmonary malignancy. Recommend follow-up dedicated contrast-enhanced completion CT scan of the chest, which can be performed on a 
nonemergent basis. 
  
4. Right posterior inferior buttock partial visualized 1.5 cm rim-enhancing soft 
tissue nodule. While indeterminate there is concern that this may represent soft 
tissue metastasis. ROS:  
As above Patient Active Problem List  
 Diagnosis Date Noted  Pulmonary nodules/lesions, multiple 2018  Pelvic relaxation due to cervical stump prolpase 2018 Past Medical History:  
Diagnosis Date  Arthritis   
 knees  Autoimmune disease ([facility] [address]   
 psoriasis  Diabetes ([facility] [address] 2018  Lung cancer ([facility] [address]  Psoriasis  Psychiatric disorder   
 anxiety Past Surgical History:  
Procedure Laterality Date  [address] [place] [address] UNLISTED 12 inch of colon removed r/t non cancerous tumor  HX APPENDECTOMY  HX CHOLECYSTECTOMY  HX HYSTERECTOMY  HX TONSILLECTOMY  HX TUBAL LIGATION    
 HX UROLOGICAL    
 bladder lift OB History  Para Term  AB Living 2 2 2   2 SAB TAB Ectopic Molar Multiple Live Births 2 Social History Substance Use Topics  Smoking status: Former Smoker Quit date: 2015  Smokeless tobacco: Current User Comment: \"vape some\"  Alcohol use No  
  
Family History Problem Relation Age of Onset  Diabetes Mother  Coronary Artery Disease Mother  Alzheimer Mother  Anxiety Mother  Hypertension Father  Emphysema Father Current Outpatient Prescriptions Medication Sig  ALPRAZolam (XANAX) 2 mg tablet 1 tablet by mouth every 8 hours as needed for severe anxiety  atorvastatin (LIPITOR) 20 mg tablet TAKE 1 TABLET BY MOUTH bedtime  clobetasol (TEMOVATE) 0.05 % ointment Apply  to affected area.  hydrOXYzine HCl (ATARAX) 10 mg tablet Take 10 mg by mouth. Indications: psoriasis  metFORMIN (GLUCOPHAGE) 500 mg tablet Take 500 mg by mouth nightly. Indications: type 2 diabetes mellitus  B infantis/B ani/B cristine/B bifid (PROBIOTIC 4X PO) Take 1 Tab by mouth.  traZODone (DESYREL) 150 mg tablet 1 po qhs  
 urea (CARMOL) 40 % topical cream NAY MARBLE SIZED AMOUNT TO THICKENED AREAS ON BODY ONCE TO BID PRN  
 diclofenac EC (VOLTAREN) 75 mg EC tablet Take  by mouth two (2) times daily as needed.  oxyCODONE-acetaminophen (PERCOCET) 5-325 mg per tablet Take 1 Tab by mouth every four (4) hours as needed. Max Daily Amount: 6 Tabs.  etanercept (ENBREL) 50 mg/mL (0.98 mL) injection by SubCUTAneous route Every Thursday. No current facility-administered medications for this visit. Allergies Allergen Reactions  Bee Venom Protein (Honey Bee) Anaphylaxis  Lemon Hives  
  fresh  Vicodin [Hydrocodone-Acetaminophen] Hives, Itching and Swelling OBJECTIVE: 
 
Physical Exam 
VITAL SIGNS: Visit Vitals  /81 (BP 1 Location: Right arm, BP Patient Position: Sitting)  Pulse 92  Temp 97 °F (36.1 °C) (Oral)  Resp 16  
 Ht 5' 8\" (1.727 m)  Wt 82.4 kg (181 lb 9.6 oz)  SpO2 97%  BMI 27.61 kg/m2 GENERAL NAY: in no apparent distress and well developed and well nourished MUSCULOSKEL: no joint tenderness, deformity or swelling INTEGUMENT:  warm and dry, no rashes or lesions ABDOMEN . soft, NT, ND, pelvic mass palpable to deep palpation EXTREMITIES: extremities normal, atraumatic, no cyanosis or edema PELVIC: Exam deferred. RECTAL: deferred SHIRLENE SURVEY: Cervical, supraclavicular, axillary and inguinal nodes normal.  
NEURO: Grossly normal  
 
 
 
IMPRESSION/PLAN: 
1. Pelvic mass, Lung cancer,  peritoneal nodules, buttock nodule 
 -discussed  possible sampling error from FNA; recommend definitive diagnosis with excision of mass 
 -plan for xlap, resection of pelvic mass, possible BSO on 9/10/2018 
 -will f/u with thoracic re: lung cancer post pelvic surgery -Risks, benefits and alternatives of surgery discussed in detail The total time spent was 25 minutes regarding this patients diagnosis of pelvic mass, lung cancer and >50% of this time was spent counseling and coordinating care Jl Pickard DO Gynecologic Oncology 1/6/27878:97 AM

## 2018-09-11 LAB
ANION GAP SERPL CALC-SCNC: 6 MMOL/L (ref 3–18)
BASOPHILS # BLD: 0 K/UL (ref 0–0.1)
BASOPHILS NFR BLD: 0 % (ref 0–2)
BUN SERPL-MCNC: 12 MG/DL (ref 7–18)
BUN/CREAT SERPL: 16 (ref 12–20)
CALCIUM SERPL-MCNC: 8.2 MG/DL (ref 8.5–10.1)
CHLORIDE SERPL-SCNC: 105 MMOL/L (ref 100–108)
CO2 SERPL-SCNC: 27 MMOL/L (ref 21–32)
CREAT SERPL-MCNC: 0.76 MG/DL (ref 0.6–1.3)
DIFFERENTIAL METHOD BLD: ABNORMAL
EOSINOPHIL # BLD: 0 K/UL (ref 0–0.4)
EOSINOPHIL NFR BLD: 0 % (ref 0–5)
ERYTHROCYTE [DISTWIDTH] IN BLOOD BY AUTOMATED COUNT: 14.1 % (ref 11.6–14.5)
GLUCOSE BLD STRIP.AUTO-MCNC: 112 MG/DL (ref 70–110)
GLUCOSE BLD STRIP.AUTO-MCNC: 129 MG/DL (ref 70–110)
GLUCOSE BLD STRIP.AUTO-MCNC: 134 MG/DL (ref 70–110)
GLUCOSE BLD STRIP.AUTO-MCNC: 145 MG/DL (ref 70–110)
GLUCOSE BLD STRIP.AUTO-MCNC: 158 MG/DL (ref 70–110)
GLUCOSE SERPL-MCNC: 113 MG/DL (ref 74–99)
HCT VFR BLD AUTO: 31.7 % (ref 35–45)
HGB BLD-MCNC: 10.3 G/DL (ref 12–16)
LYMPHOCYTES # BLD: 1.2 K/UL (ref 0.9–3.6)
LYMPHOCYTES NFR BLD: 17 % (ref 21–52)
MCH RBC QN AUTO: 28.6 PG (ref 24–34)
MCHC RBC AUTO-ENTMCNC: 32.5 G/DL (ref 31–37)
MCV RBC AUTO: 88.1 FL (ref 74–97)
MONOCYTES # BLD: 0.7 K/UL (ref 0.05–1.2)
MONOCYTES NFR BLD: 10 % (ref 3–10)
NEUTS SEG # BLD: 5 K/UL (ref 1.8–8)
NEUTS SEG NFR BLD: 73 % (ref 40–73)
PLATELET # BLD AUTO: 280 K/UL (ref 135–420)
PMV BLD AUTO: 9.2 FL (ref 9.2–11.8)
POTASSIUM SERPL-SCNC: 5.1 MMOL/L (ref 3.5–5.5)
RBC # BLD AUTO: 3.6 M/UL (ref 4.2–5.3)
SODIUM SERPL-SCNC: 138 MMOL/L (ref 136–145)
WBC # BLD AUTO: 6.9 K/UL (ref 4.6–13.2)

## 2018-09-11 PROCEDURE — 65270000029 HC RM PRIVATE

## 2018-09-11 PROCEDURE — 85025 COMPLETE CBC W/AUTO DIFF WBC: CPT | Performed by: OBSTETRICS & GYNECOLOGY

## 2018-09-11 PROCEDURE — 80048 BASIC METABOLIC PNL TOTAL CA: CPT | Performed by: OBSTETRICS & GYNECOLOGY

## 2018-09-11 PROCEDURE — 36415 COLL VENOUS BLD VENIPUNCTURE: CPT | Performed by: OBSTETRICS & GYNECOLOGY

## 2018-09-11 PROCEDURE — 82962 GLUCOSE BLOOD TEST: CPT

## 2018-09-11 PROCEDURE — 74011250636 HC RX REV CODE- 250/636: Performed by: OBSTETRICS & GYNECOLOGY

## 2018-09-11 PROCEDURE — 74011250637 HC RX REV CODE- 250/637: Performed by: OBSTETRICS & GYNECOLOGY

## 2018-09-11 PROCEDURE — 77030037875 HC DRSG MEPILEX <16IN BORD MOLN -A

## 2018-09-11 PROCEDURE — 74011250637 HC RX REV CODE- 250/637: Performed by: NURSE PRACTITIONER

## 2018-09-11 RX ORDER — HYDROMORPHONE HYDROCHLORIDE 2 MG/1
2-4 TABLET ORAL
Status: DISCONTINUED | OUTPATIENT
Start: 2018-09-11 | End: 2018-09-12 | Stop reason: HOSPADM

## 2018-09-11 RX ADMIN — Medication 10 ML: at 15:51

## 2018-09-11 RX ADMIN — HYDROMORPHONE HYDROCHLORIDE 2 MG: 2 TABLET ORAL at 18:15

## 2018-09-11 RX ADMIN — ATORVASTATIN CALCIUM 20 MG: 20 TABLET, FILM COATED ORAL at 09:52

## 2018-09-11 RX ADMIN — HYDROMORPHONE HYDROCHLORIDE 2 MG: 2 TABLET ORAL at 22:05

## 2018-09-11 RX ADMIN — ENOXAPARIN SODIUM 40 MG: 40 INJECTION, SOLUTION INTRAVENOUS; SUBCUTANEOUS at 09:52

## 2018-09-11 RX ADMIN — Medication 2 G: at 04:05

## 2018-09-11 RX ADMIN — SODIUM CHLORIDE, SODIUM LACTATE, POTASSIUM CHLORIDE, AND CALCIUM CHLORIDE 125 ML/HR: 600; 310; 30; 20 INJECTION, SOLUTION INTRAVENOUS at 15:46

## 2018-09-11 RX ADMIN — HYDROMORPHONE HYDROCHLORIDE 2 MG: 2 TABLET ORAL at 13:51

## 2018-09-11 RX ADMIN — Medication 10 ML: at 14:00

## 2018-09-11 RX ADMIN — HYDROXYZINE HYDROCHLORIDE 10 MG: 10 TABLET, FILM COATED ORAL at 22:06

## 2018-09-11 NOTE — PROGRESS NOTES
Bedside shift change report given to SEBASTIAN Wilson RN  Report included the following information SBAR, Kardex, Procedure Summary, Intake/Output, MAR and Recent Results.

## 2018-09-11 NOTE — PROGRESS NOTES
Problem: Falls - Risk of  Goal: *Absence of Falls  Document Guillermo Fall Risk and appropriate interventions in the flowsheet.    Outcome: Progressing Towards Goal  Fall Risk Interventions:            Medication Interventions: Patient to call before getting OOB, Teach patient to arise slowly

## 2018-09-11 NOTE — ROUTINE PROCESS
Bedside and Verbal shift change report given to FANG Romeo RN (oncoming nurse) by Yoel Pelaez (offgoing nurse). Report included the following information SBAR, Kardex, Intake/Output and MAR.

## 2018-09-11 NOTE — ROUTINE PROCESS
Bedside shift change report given to Daniela Cantrell RN (oncoming nurse) by Cathleen Hopkins RN (offgoing nurse). Report included the following information SBAR, Kardex, Intake/Output and MAR.

## 2018-09-11 NOTE — OP NOTES
[facility]  OPERATIVE REPORT    Rebecca Pedroza  MR#: [id number]  : 1960  ACCOUNT #: [de-identified]   DATE OF SERVICE: 09/10/2018    PREOPERATIVE DIAGNOSIS:  Pelvic mass. POSTOPERATIVE DIAGNOSIS:  Pelvic mass. PROCEDURES PERFORMED:  Exploratory laparotomy, resection of abdominopelvic mass, bilateral salpingo-oophorectomy. SURGEON:  Debi Barksdale DO    ASSISTANT:  Juice Sood. ANESTHESIA:  General.    FLUIDS:  2400 mL crystalloid. URINE OUTPUT:  200 mL clear urine. ESTIMATED BLOOD LOSS:  200 mL. COMPLICATIONS:  None. SPECIMENS REMOVED:  Pelvic mass with right tube and ovary, left ovary. IMPLANTS:  None. FINDINGS:  Upon laparotomy, she had a large multi-loculated pelvic mass that was adhered to her ureter on the right as well as the bladder anteriorly and the pelvic floor and the rectum as well as some attachments to the omentum. INDICATION:  The patient is a 59-year-old who underwent a trachelectomy with Dr. Vicki Hernandez in July at which point a mass was felt. A CT scan was performed which revealed a large abdominopelvic mass as well as a pulmonary mass. She had a chest CT done which revealed other pulmonary nodules and had a biopsy of this lung mass consistent with adenocarcinoma of bronchogenic origin. She subsequently had a CT-guided biopsy of this pelvic mass consistent with leiomyoma; however, given concern for possible sarcoma and sampling error, she presents today for further evaluation. PROCEDURE IN DETAIL:  The patient was taken to the operating room where general anesthesia was obtained without difficulty. She was placed in dorsal lithotomy position, prepped and draped in a sterile fashion. Surgical timeout was taken with the entire surgical team.  Osborne catheter was placed. A midline laparotomy was then made from the pubic symphysis to approximately 1 cm above the umbilicus. This was carried down to the underlying fascia with the Bovie. The fascia was then incised. Incision extended superior and inferiorly. Rectus muscles were  in the midline. The peritoneum was identified and entered sharply with Metzenbaum scissors. The peritoneal incision was then extended superiorly and inferiorly. At this point, the Bookwalter retractor was placed. Some omental adhesions to the mass were taken down using EnSeal device. Once this was accomplished, the mass was retracted medially and the peritoneum overlying the right psoas muscle was incised using monopolar cautery and this incision was carried inferior and superior around the mass at which point the ureter was identified and adhered to the most lower portion of this mass. Therefore, ureterolysis was performed to dissect the ureter deep and lateral to our dissection. The IP ligament was identified, skeletonized, sealed and divided using the EnSeal device. The peritoneal incision was then extended posteriorly along this mass which allowed us to find a plane between this mass and the sacrum down to the level of the levators. At this point, attention was turned to the left side where the bowel was packed away with moist laparotomy sponges. We were able to develop a plane on the left side between the peritoneum and the pelvic sidewall. This was carried anteriorly where careful dissection was undertaken to mobilize the bladder off of the anterior mass and create a bladder flap. Once this was accomplished, we were able to shell out this large mass using monopolar and bipolar cautery to seal and divide its attachments and this mass was handed off for frozen section consistent with a benign leiomyoma. At this point, the bowel was further packed away with moist laparotomy sponges. The left retroperitoneal space was identified and the left ovary was identified. The ureter was then identified in the retroperitoneal space and a window above that was made.   The left IP ligament was skeletonized, sealed, and divided and the left ovary was handed off for permanent specimen. Next, using monopolar cautery, some small bleeding vessels from the resection bed were addressed and this offered better hemostasis. The pelvis and abdomen were then irrigated copiously. FloSeal was injected into the resection bed, which offered better hemostasis. At this point, the Bookwalter retractor was removed and all moist laparotomy sponges removed. The fascial incision was reapproximated with a #1 PDS in a modified Smead-Rose fashion. The subcutaneous tissue was irrigated at the umbilicus, reapproximated with 3-0 Vicryl. The skin was then closed with staples. Exparel was injected. The patient tolerated the procedure well. Sponge and needle correct x2 and the patient was taken to recovery room in stable condition.       Sherrell Forrester DO CM / Adriana Ramirez  D: 09/10/2018 14:14     T: 09/10/2018 15:50  JOB #: [id number]

## 2018-09-11 NOTE — ROUTINE PROCESS
Bedside and Verbal shift change report given to WOLF Sarmiento (oncoming nurse) by Bridgette Vasquez RN (offgoing nurse). Report included the following information SBAR, Kardex, Intake/Output and MAR.

## 2018-09-11 NOTE — PROGRESS NOTES
1932 - Assumed care at this time. Pain rated 2/10. Pt resting quietly in bed, visitor at bedside. No signs of distress. Will continue to monitor. Pt encouraged to call for assistance. 2158 - Pt resting in bed at this time. A&Ox4, RA. Denies chest pain/SOB. Denies numbness/tingling. Mepilex dressing to midline abdomen dry and intact with nickel sized breakthrough drainage present. SCD bilateral. Pain rated 3/10, pain medication administered per MAR. Pt educated on IS use and pain management. No concerns voiced. Bed in lowest position, call bell within reach. 7627 - Patient rated pain 5/10, pain medication administered per MAR. Pt c/o indigestion, doctor to be paged. Call bell within reach, bed in lowest position. Pt encouraged to use call bell for any needs. 0670 - Doctor paged at this time for [facility] request.    6260 - Pt given milk to ease symptoms of indigestion until return call.    0895 - Spoke with Dr. Mercedes Gee at this time. Request for TUMS granted with telephone orders for TUMS.

## 2018-09-11 NOTE — PROGRESS NOTES
Transition of Care (ANISH) Plan:        Chart reviewed. Pt admitted for an elective surgical procedure. Pt is independent and has been ambulating in the halls. Please continue to encourage ambulation. No transition of care needs identified. Anticipate pt will be medically stable for discharge within the next 24-48 hours. CM available to assist as needed. 1130:  CM met with pt at bedside. Pt has confirmed independence and has indicated her  will assist her upon discharge and transport her home. No needs have been identified. CM continuing to follow and assist as needed. ANISH Transportation:   How is patient being transported at discharge? Family/Friend      When? Once cleared by physician     Is transport scheduled? N/A      Follow-up appointment and transportation:   PCP/Specialist?  See AVS for Appointment         Who is transporting to the follow-up appointment? Self/Family/Friend      Is transport for follow up appointment scheduled? N/A    Communication plan (with patient/family): Who is being called? Patient or Next of Kin? Responsible party? Patient      What number(s) is to be used? See Facesheet      What service provider is calling for [facility] services? When are they calling? Readmission Risk? (Green/Low; Yellow/Moderate; Red/High):  Green      Care Management Interventions  Mode of Transport at Discharge:  Other (see comment) (spouse/family)  Transition of Care Consult (CM Consult): Discharge Planning  Health Maintenance Reviewed: Yes  Current Support Network: Lives with Spouse  Confirm Follow Up Transport: Self  Discharge Location  Discharge Placement: Home with family assistance

## 2018-09-11 NOTE — PROGRESS NOTES
Admit date: 9/10/2018        ASSESSMENT/PLAN  Seferino Guillermo is a 59 y. o.female POD #1 s/p exploratory laparotomy, resection of abdominopelvic mass, BSO  Onc - benign, final path pending  GI - no nausea today, tolerating regular diet. [email or web address] [email or web address], katja stable, advance diet as tolerated. No n/v  ID - WBC nl, afbebrile  Renal - creat nl, UOP good  Heme - hgb 10.3, will monitor  DVT Prophylaxis - Lovenox, SCDs, encourage ambulation  CV - intermittent tachycardia, will monitor, normotensive  Pulm - encourage IS  Pain - d/c PCA and transition to PO Dilaudid  Disp - continue inpatient management    SUBJECTIVE  Hasn't needed PCA much. No n/v. Tolerated regular diet for breakfast. Belching and hiccups but no flatus yet. Has voided.        OBJECTIVE  Physical Exam:  Patient Vitals for the past 24 hrs:   BP Temp Pulse Resp SpO2 Height Weight   09/11/18 0814 103/65 98.6 °F (37 °C) 95 18 96 % - -   09/11/18 0333 107/66 97.9 °F (36.6 °C) (!) 103 15 99 % - -   09/10/18 2308 - - - - - - 87.8 kg (193 lb 9.6 oz)   09/10/18 2307 97/60 97.8 °F (36.6 °C) 99 15 100 % - -   09/10/18 1927 104/63 98 °F (36.7 °C) 91 15 98 % - -   09/10/18 1751 101/81 97.9 °F (36.6 °C) (!) 103 15 100 % - -   09/10/18 1648 91/62 97.9 °F (36.6 °C) 100 15 98 % - -   09/10/18 1558 96/53 97.5 °F (36.4 °C) 99 12 99 % - -   09/10/18 1540 100/63 - 87 18 99 % - -   09/10/18 1535 101/57 - 81 14 99 % - -   09/10/18 1530 103/58 - 81 12 99 % - -   09/10/18 1525 - - 69 11 98 % - -   09/10/18 1520 100/64 - 80 15 100 % - -   09/10/18 1515 108/55 - 76 10 100 % - -   09/10/18 1510 104/67 - 66 - 99 % - -   09/10/18 1505 105/61 - 68 11 98 % - -   09/10/18 1500 107/59 - 76 10 99 % - -   09/10/18 1455 108/59 98.5 °F (36.9 °C) 72 10 100 % - -   09/10/18 1450 111/62 - 71 - 100 % - -   09/10/18 1445 111/63 - 69 8 100 % - -   09/10/18 1440 119/70 - 65 - 100 % - -   09/10/18 1435 124/65 - 64 - 100 % - -   09/10/18 1430 115/55 - 65 10 100 % - -   09/10/18 1425 121/63 - 62 8 100 % - -   09/10/18 1420 126/63 - 62 8 100 % - -   09/10/18 1415 124/63 - 65 14 100 % - -   09/10/18 1410 125/63 - 66 12 100 % - -   09/10/18 1407 - - 70 17 99 % - -   09/10/18 1406 113/63 - - - - - -   09/10/18 1405 113/63 98.5 °F (36.9 °C) 86 13 100 % - -   09/10/18 1053 112/71 98.1 °F (36.7 °C) 84 16 100 % 5' 7.5\" (1.715 m) 80.4 kg (177 lb 5 oz)         Intake/Output Summary (Last 24 hours) at 09/11/18 0848  Last data filed at 09/11/18 0815   Gross per 24 hour   Intake          3308.33 ml   Output             2450 ml   Net           858.33 ml        General - resting in no acute distress, alert and oriented  HEENT - within normal limits  Heart - regular rate and rhythm  Lungs - clear to auscultation  Abdomen - soft, nontender, nondistended, normal bowel sounds  Incision - clean, dry, intact  Extremities - no edema    Labs  CBC  Recent Labs      09/11/18   0406   WBC  6.9   HCT  31.7*   MCV  88.1   MONOS  10   EOS  0   BASOS  0        CMP  Recent Labs      09/11/18   0406   NA  138   CO2  27   BUN  12        Lab Results   Component Value Date/Time    Glucose 113 (H) 09/11/2018 04:06 AM    Glucose (POC) 134 (H) 09/10/2018 10:03 PM          Current Hospital Medications    Current Facility-Administered Medications:     lactated Ringers infusion, 125 mL/hr, IntraVENous, CONTINUOUS, Oma Hendricks MD, Last Rate: 125 mL/hr at 09/10/18 1936, 125 mL/hr at 09/10/18 1936    ALPRAZolam Estil Massa) tablet 2 mg, 2 mg, Oral, QID PRN, Oma Hendricks MD    atorvastatin (LIPITOR) tablet 20 mg, 20 mg, Oral, DAILY, Oma Hendricks MD    hydrOXYzine HCl (ATARAX) tablet 10 mg, 10 mg, Oral, TID, Oma Hendricks MD, 10 mg at 09/10/18 1820    traZODone (DESYREL) tablet 150 mg, 150 mg, Oral, QHS PRN, Oma Hendricks MD    sodium chloride (NS) flush 5-10 mL, 5-10 mL, IntraVENous, Q8H, Oma Hendricks MD, Stopped at 09/10/18 2200    sodium chloride (NS) flush 5-10 mL, 5-10 mL, IntraVENous, PRN, Patricio No MD    ketorolac (TORADOL) injection 15 mg, 15 mg, IntraVENous, Q6H PRN, Patricio No MD    naloxone [facility]) injection 0.4 mg, 0.4 mg, IntraVENous, PRN, Patricio No MD    ondansetron [facility]) injection 4 mg, 4 mg, IntraVENous, Q4H PRN, Patricio No MD, 4 mg at 09/10/18 2027    diphenhydrAMINE (BENADRYL) capsule 25 mg, 25 mg, Oral, Q4H PRN, Patricio No MD    benzocaine-menthol (CEPACOL) lozenge 1 Lozenge, 1 Lozenge, Oral, PRN, Patricio No MD    enoxaparin (LOVENOX) injection 40 mg, 40 mg, SubCUTAneous, Q24H, Patricio No MD    insulin lispro (HUMALOG) injection, , SubCUTAneous, AC&HS, Patricio No MD, Stopped at 09/10/18 1630    glucose chewable tablet 16 g, 4 Tab, Oral, PRN, Patricio No MD    glucagon [facility]) injection 1 mg, 1 mg, IntraMUSCular, PRN, Patricio No MD    dextrose (D50W) injection syrg 12.5-25 g, 25-50 mL, IntraVENous, PRN, Patricio No MD    sodium chloride (NS) flush 5-10 mL, 5-10 mL, IntraVENous, Q8H, Patricio No MD, Stopped at 09/10/18 1700    sodium chloride (NS) flush 5-10 mL, 5-10 mL, IntraVENous, PRN, Patricio No MD    HYDROmorphone (PF) (DILAUDID) 30 mg / 30 mL PCA, , IntraVENous, TITRATE, MD Angela Sauceda [facility]  Pager  [phone]

## 2018-09-11 NOTE — PROGRESS NOTES
Shift summary  Received patient from the off going nurse,alert and oriented x4 in no apparent distress,recieving PCA dilaudid for pain management. Attempted ambulation to the door step but got real dizzy and nauseated. Abdominalincision looks clean dry and intact with minimal shadowing on the mapilex,guaman patent with yellow/clear urine. Ataraxadministered for itching,patient comfortable in no apparent distress. 2255  Bedside and Verbal shift change report given to kServando [facility] [place] [address] rn (oncoming nurse) by Corwin Murry RN   (offgoing nurse). Report included the following information SBAR, Kardex and MAR.

## 2018-09-11 NOTE — PROGRESS NOTES
Patient alert and oriented X4, pleasant, and compliant with care. Ambulated to bathroom for the first time this morning with the assistance of SHOAIB Squires at 9:15 am. Onset of pain when pt turns and ambulates. Patient PCA pump discontinued now prescribed prn oral dilaudid. 2:00 pm patient requested pain medicine. Was given 2 mg of oral dilaudid. Patient refused her morning and afternoon doses of Hydroxyzine because itching not present, states she will take at bedtime. Accu checks Q4 hrs. Patient has not needed insulin during this shift. Patient takes Metformin at home, has not taken during admission. Osborne catheter removed this morning. Patient was advised to call for help when going to the bathroom to prevent falls. Patient ambulated in the hallway with a visitor; tolerated very well.

## 2018-09-11 NOTE — PROGRESS NOTES
Problem: Falls - Risk of  Goal: *Absence of Falls  Document Guillermo Fall Risk and appropriate interventions in the flowsheet. Outcome: Progressing Towards Goal  Fall Risk Interventions:            Medication Interventions: Bed/chair exit alarm, Evaluate medications/consider consulting pharmacy, Teach patient to arise slowly    Elimination Interventions: Call light in reach, Patient to call for help with toileting needs             Problem: Pressure Injury - Risk of  Goal: *Prevention of pressure injury  Document Tobias Scale and appropriate interventions in the flowsheet. Outcome: Progressing Towards Goal  Pressure Injury Interventions:             Activity Interventions: Assess need for specialty bed, Pressure redistribution bed/mattress(bed type), Increase time out of bed    Mobility Interventions: Assess need for specialty bed, Pressure redistribution bed/mattress (bed type), PT/OT evaluation    Nutrition Interventions: Document food/fluid/supplement intake, Offer support with meals,snacks and hydration    Friction and Shear Interventions: HOB 30 degrees or less

## 2018-09-12 VITALS
OXYGEN SATURATION: 95 % | SYSTOLIC BLOOD PRESSURE: 117 MMHG | HEART RATE: 82 BPM | BODY MASS INDEX: 27.26 KG/M2 | HEIGHT: 68 IN | DIASTOLIC BLOOD PRESSURE: 71 MMHG | TEMPERATURE: 98.3 F | RESPIRATION RATE: 16 BRPM | WEIGHT: 179.9 LBS

## 2018-09-12 LAB
ANION GAP SERPL CALC-SCNC: 9 MMOL/L (ref 3–18)
BASOPHILS # BLD: 0 K/UL (ref 0–0.1)
BASOPHILS NFR BLD: 0 % (ref 0–2)
BUN SERPL-MCNC: 8 MG/DL (ref 7–18)
BUN/CREAT SERPL: 11 (ref 12–20)
CALCIUM SERPL-MCNC: 8.3 MG/DL (ref 8.5–10.1)
CHLORIDE SERPL-SCNC: 103 MMOL/L (ref 100–108)
CO2 SERPL-SCNC: 29 MMOL/L (ref 21–32)
CREAT SERPL-MCNC: 0.72 MG/DL (ref 0.6–1.3)
DIFFERENTIAL METHOD BLD: ABNORMAL
EOSINOPHIL # BLD: 0.1 K/UL (ref 0–0.4)
EOSINOPHIL NFR BLD: 1 % (ref 0–5)
ERYTHROCYTE [DISTWIDTH] IN BLOOD BY AUTOMATED COUNT: 14.5 % (ref 11.6–14.5)
GLUCOSE BLD STRIP.AUTO-MCNC: 137 MG/DL (ref 70–110)
GLUCOSE SERPL-MCNC: 123 MG/DL (ref 74–99)
HCT VFR BLD AUTO: 31.7 % (ref 35–45)
HGB BLD-MCNC: 10.1 G/DL (ref 12–16)
LYMPHOCYTES # BLD: 1.7 K/UL (ref 0.9–3.6)
LYMPHOCYTES NFR BLD: 18 % (ref 21–52)
MCH RBC QN AUTO: 28.6 PG (ref 24–34)
MCHC RBC AUTO-ENTMCNC: 31.9 G/DL (ref 31–37)
MCV RBC AUTO: 89.8 FL (ref 74–97)
MONOCYTES # BLD: 0.8 K/UL (ref 0.05–1.2)
MONOCYTES NFR BLD: 9 % (ref 3–10)
NEUTS SEG # BLD: 6.6 K/UL (ref 1.8–8)
NEUTS SEG NFR BLD: 72 % (ref 40–73)
PLATELET # BLD AUTO: 324 K/UL (ref 135–420)
PMV BLD AUTO: 9.7 FL (ref 9.2–11.8)
POTASSIUM SERPL-SCNC: 3.9 MMOL/L (ref 3.5–5.5)
RBC # BLD AUTO: 3.53 M/UL (ref 4.2–5.3)
SODIUM SERPL-SCNC: 141 MMOL/L (ref 136–145)
TSH SERPL DL<=0.05 MIU/L-ACNC: 1.3 UIU/ML (ref 0.36–3.74)
WBC # BLD AUTO: 9.2 K/UL (ref 4.6–13.2)

## 2018-09-12 PROCEDURE — 84443 ASSAY THYROID STIM HORMONE: CPT | Performed by: NURSE PRACTITIONER

## 2018-09-12 PROCEDURE — 74011250637 HC RX REV CODE- 250/637: Performed by: OBSTETRICS & GYNECOLOGY

## 2018-09-12 PROCEDURE — 80048 BASIC METABOLIC PNL TOTAL CA: CPT | Performed by: OBSTETRICS & GYNECOLOGY

## 2018-09-12 PROCEDURE — 36415 COLL VENOUS BLD VENIPUNCTURE: CPT | Performed by: OBSTETRICS & GYNECOLOGY

## 2018-09-12 PROCEDURE — 82962 GLUCOSE BLOOD TEST: CPT

## 2018-09-12 PROCEDURE — 74011250636 HC RX REV CODE- 250/636: Performed by: OBSTETRICS & GYNECOLOGY

## 2018-09-12 PROCEDURE — 74011250637 HC RX REV CODE- 250/637: Performed by: NURSE PRACTITIONER

## 2018-09-12 PROCEDURE — 85025 COMPLETE CBC W/AUTO DIFF WBC: CPT | Performed by: OBSTETRICS & GYNECOLOGY

## 2018-09-12 PROCEDURE — 77030037875 HC DRSG MEPILEX <16IN BORD MOLN -A

## 2018-09-12 RX ORDER — HYDROMORPHONE HYDROCHLORIDE 2 MG/1
2-4 TABLET ORAL
Qty: 60 TAB | Refills: 0 | Status: SHIPPED | OUTPATIENT
Start: 2018-09-12 | End: 2018-10-08 | Stop reason: ALTCHOICE

## 2018-09-12 RX ORDER — CALCIUM CARBONATE 200(500)MG
200 TABLET,CHEWABLE ORAL AS NEEDED
Status: DISCONTINUED | OUTPATIENT
Start: 2018-09-12 | End: 2018-09-12 | Stop reason: HOSPADM

## 2018-09-12 RX ADMIN — ENOXAPARIN SODIUM 40 MG: 40 INJECTION, SOLUTION INTRAVENOUS; SUBCUTANEOUS at 08:42

## 2018-09-12 RX ADMIN — ANTACID TABLETS 200 MG: 500 TABLET, CHEWABLE ORAL at 05:04

## 2018-09-12 RX ADMIN — ATORVASTATIN CALCIUM 20 MG: 20 TABLET, FILM COATED ORAL at 08:42

## 2018-09-12 RX ADMIN — HYDROMORPHONE HYDROCHLORIDE 2 MG: 2 TABLET ORAL at 08:42

## 2018-09-12 RX ADMIN — HYDROMORPHONE HYDROCHLORIDE 2 MG: 2 TABLET ORAL at 04:12

## 2018-09-12 NOTE — DISCHARGE SUMMARY
GYN Discharge Summary                        Patient ID:  Bridget Jeong  59 y.o. Admission Date: 9/10/2018  Discharge Date:  09/12/18                                                   Attending: Jaki Gandhi MD   PCP: Era Ahumada MD                                                                                               Reason for admission:Pelvic mass. Discharge  diagnosis: Active Problems:    Pelvic mass in female (9/10/2018)        Associated Conditions:        Patient Active Problem List   Diagnosis Code    Pelvic relaxation due to cervical stump prolpase N81.85    Pulmonary nodules/lesions, multiple R91.8    Pelvic mass in female R19.00              Past Medical History:   Diagnosis Date    Arthritis     knees    Autoimmune disease ([facility]     psoriasis    Diabetes ([facility] 2018    Lung cancer ([facility]     Psoriasis     Psychiatric disorder     anxiety       Operative Procedure: Exploratory laparotomy, resection of abdominopelvic mass, bilateral salpingo-oophorectomy.     Complications:  none                   Transfusion:  none    Hospital Course: uncomplicated    Condition at discharge: good, stable, Afebrile, Ambulating and Eating, Drinking, Voiding    Labs:  Lab Results   Component Value Date/Time    WBC 9.2 09/12/2018 04:00 AM    HGB 10.1 (L) 09/12/2018 04:00 AM    HCT 31.7 (L) 09/12/2018 04:00 AM    PLATELET 331 10/05/9364 04:00 AM    MCV 89.8 09/12/2018 04:00 AM     Lab Results   Component Value Date/Time    Sodium 141 09/12/2018 04:00 AM    Potassium 3.9 09/12/2018 04:00 AM    Chloride 103 09/12/2018 04:00 AM    CO2 29 09/12/2018 04:00 AM    Anion gap 9 09/12/2018 04:00 AM    Glucose 123 (H) 09/12/2018 04:00 AM    BUN 8 09/12/2018 04:00 AM    Creatinine 0.72 09/12/2018 04:00 AM    BUN/Creatinine ratio 11 (L) 09/12/2018 04:00 AM    GFR est AA >60 09/12/2018 04:00 AM    GFR est non-AA >60 09/12/2018 04:00 AM         Cannot display discharge medications since this patient is not currently admitted. Patient Instructions:        Disposition:  Home    Follow-up:  Follow up for staple removal 9/19/18 and for formal postop appointment with Dr. Brenda Sumner in 4 weeks.     Author:   Bre Reyes [facility]  Gynecologic Oncology  9/12/2018  11:22 AM

## 2018-09-12 NOTE — ROUTINE PROCESS
Verbal shift change report given to Martín Wilks (oncoming nurse) by Marlen Rosales RN (offgoing nurse). Report included the following information SBAR, Kardex and MAR.

## 2018-09-12 NOTE — DISCHARGE INSTRUCTIONS
Acute Pain After Surgery: Care Instructions  Your Care Instructions    It's common to have some pain after surgery. Pain doesn't mean that something is wrong or that the surgery didn't go well. But when the pain is severe, it's important to work with your doctor to manage it. It's also important to be aware of a few facts about pain and pain medicine. · You are the only person who knows what your pain feels like. So be sure to tell your doctor when you are in pain or when the pain changes. Then he or she will know how to adjust your medicines. · Pain is often easier to control right after it starts. So it may be better to take regular doses of pain medicine and not wait until the pain gets bad. · Medicine can help control pain. But this doesn't mean you'll have no pain. Medicine works to keep the pain at a level you can live with. With time, you will feel better. Follow-up care is a key part of your treatment and safety. Be sure to make and go to all appointments, and call your doctor if you are having problems. It's also a good idea to know your test results and keep a list of the medicines you take. How can you care for yourself at home? · Be safe with medicines. Read and follow all instructions on the label. ¨ If the doctor gave you a prescription medicine for pain, take it as prescribed. ¨ If you are not taking a prescription pain medicine, ask your doctor if you can take an over-the-counter medicine. · If you take an over-the-counter pain medicine, such as acetaminophen (Tylenol), ibuprofen (Advil, Motrin), or naproxen (Aleve), read and follow all instructions on the label. · Do not take two or more pain medicines at the same time unless the doctor told you to. · Do not drink alcohol while you are taking pain medicines. · Try to walk each day if your doctor recommends it. Start by walking a little more than you did the day before. Bit by bit, increase the amount you walk.  Walking increases blood flow. It also helps prevent pneumonia and constipation. · To prevent constipation from opioid pain medicines:  ¨ Talk to your doctor about a laxative. ¨ Include fruits, vegetables, beans, and whole grains in your diet each day. These foods are high in fiber. ¨ Drink plenty of fluids, enough so that your urine is light yellow or clear like water. Drink water, fruit juice, or other drinks that do not contain caffeine or alcohol. If you have kidney, heart, or liver disease and have to limit fluids, talk with your doctor before you increase the amount of fluids you drink. ¨ Take a fiber supplement, such as Citrucel or Metamucil, every day if needed. Read and follow all instructions on the label. If you take pain medicine for more than a few days, talk to your doctor before you take fiber. When should you call for help? Call your doctor now or seek immediate medical care if:    · Your pain gets worse.     · Your pain is not controlled by medicine.    Watch closely for changes in your health, and be sure to contact your doctor if you have any problems. Where can you learn more? Go to http://sujit-colleen.info/. Enter ([phone] 329-794 in the search box to learn more about \"Acute Pain After Surgery: Care Instructions. \"  Current as of: November 20, 2017  Content Version: 11.7  © 9538-4772 [employer]. Care instructions adapted under license by [employer] (which disclaims liability or warranty for this information). If you have questions about a medical condition or this instruction, always ask your healthcare professional. [place] [address] any warranty or liability for your use of this information.     DISCHARGE SUMMARY from Nurse    PATIENT INSTRUCTIONS:    After general anesthesia or intravenous sedation, for 24 hours or while taking prescription Narcotics:  · Limit your activities  · Do not drive and operate hazardous machinery  · Do not make important personal or business decisions  · Do  not drink alcoholic beverages  · If you have not urinated within 8 hours after discharge, please contact your surgeon on call. Report the following to your surgeon:  · Excessive pain, swelling, redness or odor of or around the surgical area  · Temperature over 100.5  · Nausea and vomiting lasting longer than 4 hours or if unable to take medications  · Any signs of decreased circulation or nerve impairment to extremity: change in color, persistent  numbness, tingling, coldness or increase pain  · Any questions    What to do at Home:  Recommended activity: Activity as tolerated,     *  Please give a list of your current medications to your Primary Care Provider. *  Please update this list whenever your medications are discontinued, doses are      changed, or new medications (including over-the-counter products) are added. *  Please carry medication information at all times in case of emergency situations. These are general instructions for a healthy lifestyle:    No smoking/ No tobacco products/ Avoid exposure to second hand smoke  Surgeon General's Warning:  Quitting smoking now greatly reduces serious risk to your health. Obesity, smoking, and sedentary lifestyle greatly increases your risk for illness    A healthy diet, regular physical exercise & weight monitoring are important for maintaining a healthy lifestyle    You may be retaining fluid if you have a history of heart failure or if you experience any of the following symptoms:  Weight gain of 3 pounds or more overnight or 5 pounds in a week, increased swelling in our hands or feet or shortness of breath while lying flat in bed. Please call your doctor as soon as you notice any of these symptoms; do not wait until your next office visit.     Recognize signs and symptoms of STROKE:    F-face looks uneven    A-arms unable to move or move unevenly    S-speech slurred or non-existent    T-time-call 911 as soon as signs and symptoms begin-DO NOT go       Back to bed or wait to see if you get better-TIME IS BRAIN. Warning Signs of HEART ATTACK     Call 911 if you have these symptoms:   Chest discomfort. Most heart attacks involve discomfort in the center of the chest that lasts more than a few minutes, or that goes away and comes back. It can feel like uncomfortable pressure, squeezing, fullness, or pain.  Discomfort in other areas of the upper body. Symptoms can include pain or discomfort in one or both arms, the back, neck, jaw, or stomach.  Shortness of breath with or without chest discomfort.  Other signs may include breaking out in a cold sweat, nausea, or lightheadedness. Don't wait more than five minutes to call 911 - MINUTES MATTER! Fast action can save your life. Calling 911 is almost always the fastest way to get lifesaving treatment. Emergency Medical Services staff can begin treatment when they arrive -- up to an hour sooner than if someone gets to the hospital by car. The discharge information has been reviewed with the patient. The patient verbalized understanding. Discharge medications reviewed with the patient and appropriate educational materials and side effects teaching were provided.   ___________________________________________________________________________________________________________________________________

## 2018-09-12 NOTE — PROGRESS NOTES
Problem: Falls - Risk of  Goal: *Absence of Falls  Document Guillermo Fall Risk and appropriate interventions in the flowsheet.    Outcome: Progressing Towards Goal  Fall Risk Interventions:            Medication Interventions: Bed/chair exit alarm, Evaluate medications/consider consulting pharmacy, Teach patient to arise slowly    Elimination Interventions: Call light in reach, Patient to call for help with toileting needs

## 2018-09-12 NOTE — ROUTINE PROCESS
Bedside and Verbal shift change report given to MAAME Villa RN by Renato Louis RN. Report included the following information SBAR, Kardex, OR Summary, Intake/Output and MAR.

## 2018-09-12 NOTE — PROGRESS NOTES
Patient ambulated to the bathroom during shift change independently, just needed assistance getting out of bed due to discomfort in her abdomen. Was unable to void. Ate breakfast, appetite fair. Vital signs stable and blood sugar 137 at 9:00 am, no insulin needed. Visitor present in the AM. Visitor very helpful and supportive. Patient is pleasant and compliant. Pt inquired about discharge plans. Was told by Dr. Ibeth Owens that she would d/c today. I was unable to confirm. Refused Bentyl again today; is not needed at this time.

## 2018-09-14 LAB
ABO + RH BLD: NORMAL
BLD PROD TYP BPU: NORMAL
BLD PROD TYP BPU: NORMAL
BLOOD GROUP ANTIBODIES SERPL: NORMAL
BPU ID: NORMAL
BPU ID: NORMAL
CROSSMATCH RESULT,%XM: NORMAL
CROSSMATCH RESULT,%XM: NORMAL
SPECIMEN EXP DATE BLD: NORMAL
STATUS OF UNIT,%ST: NORMAL
STATUS OF UNIT,%ST: NORMAL
UNIT DIVISION, %UDIV: 0
UNIT DIVISION, %UDIV: 0

## 2018-09-19 ENCOUNTER — TELEPHONE (OUTPATIENT)
Dept: ONCOLOGY | Age: 58
End: 2018-09-19

## 2018-09-19 NOTE — TELEPHONE ENCOUNTER
Returned patient's call and informed her that I was currently working on her disability paperwork now. Verified when her last day of work was, patient stated 9/5/2018. Explained that in terms of Laparotomy's, patient's usually stay out of work for 6-8 weeks, however we date the paperwork for 8 weeks therefore if she can go back sooner she can. Patient was satisfied with this and had no further questions or concerns, encouraged to call back if she develops any.

## 2018-09-24 ENCOUNTER — OFFICE VISIT (OUTPATIENT)
Dept: ONCOLOGY | Age: 58
End: 2018-09-24

## 2018-09-24 VITALS
RESPIRATION RATE: 18 BRPM | TEMPERATURE: 97 F | BODY MASS INDEX: 26.56 KG/M2 | SYSTOLIC BLOOD PRESSURE: 106 MMHG | DIASTOLIC BLOOD PRESSURE: 72 MMHG | WEIGHT: 169.2 LBS | HEART RATE: 79 BPM | HEIGHT: 67 IN | OXYGEN SATURATION: 99 %

## 2018-09-24 DIAGNOSIS — Z48.02 ENCOUNTER FOR STAPLE REMOVAL: Primary | ICD-10-CM

## 2018-09-24 NOTE — PROGRESS NOTES
Alexei Diallo, a 59 y.o. female,  is here for   Chief Complaint   Patient presents with    Staple Removal     14 day staple removal       Visit Vitals    /72 (BP 1 Location: Right arm, BP Patient Position: Sitting)    Pulse 79    Temp 97 °F (36.1 °C) (Oral)    Resp 18    Ht 5' 7\" (1.702 m)    Wt 76.7 kg (169 lb 3.2 oz)    SpO2 99%    BMI 26.5 kg/m2       Patient denies any persistent or worsening abdominal or pelvic pain. Denies any unusual vaginal bleeding, discharge, irritation, or odor. Denies experiencing any constipation or diarrhea, patient states she is experiencing some gas at times it is painful gas. Patient also states all her stools are soft, but not watery. . No burning, discomfort, or irritation with urination. 1. Have you been to the ER, urgent care clinic since your last visit? Hospitalized since your last visit? No    2. Have you seen or consulted any other health care providers outside of the [facility] since your last visit? Include any pap smears or colon screening. No     Removed patients 21 staples, before removal notices serous discharge around the naval area as well as a area where the wound appeared open, and informed NP Celsa Ordonez. Cleaned area of incision with normal saline. Removed staples , cleansed area again with normal saline, dried the area, placed bacitracin , and steri-stripes along the wound area. Gave the patient more steri- strips to apply if needed.  Patient verbalized an understanding of all instructions and was informed to call the office if she had anymore questions

## 2018-09-26 ENCOUNTER — TELEPHONE (OUTPATIENT)
Dept: ONCOLOGY | Age: 58
End: 2018-09-26

## 2018-10-08 ENCOUNTER — OFFICE VISIT (OUTPATIENT)
Dept: ONCOLOGY | Age: 58
End: 2018-10-08

## 2018-10-08 VITALS
WEIGHT: 170.8 LBS | BODY MASS INDEX: 26.81 KG/M2 | OXYGEN SATURATION: 100 % | DIASTOLIC BLOOD PRESSURE: 74 MMHG | HEART RATE: 65 BPM | RESPIRATION RATE: 18 BRPM | SYSTOLIC BLOOD PRESSURE: 112 MMHG | TEMPERATURE: 96.8 F | HEIGHT: 67 IN

## 2018-10-08 DIAGNOSIS — D21.9 LEIOMYOMA: Primary | ICD-10-CM

## 2018-10-08 DIAGNOSIS — Z90.722 S/P BSO (BILATERAL SALPINGO-OOPHORECTOMY): ICD-10-CM

## 2018-10-08 DIAGNOSIS — Z98.890 S/P EXPLORATORY LAPAROTOMY: ICD-10-CM

## 2018-10-08 NOTE — PATIENT INSTRUCTIONS
Below you will find information on the condition you were previously diagnosed with. Please call the office as soon as possible if you begin to experience the following symptoms: Persistent or worsening abdominal or pelvic pain, any unusual vaginal bleeding, discharge, odor, or irritation, and any changes in bladder or bowel habits such as constipation, diarrhea, burning, or general discomfort. Please call the office if you have any other questions or concerns. Laparoscopic Oophorectomy: What to Expect at [address] [place] [address]    After surgery to remove one or both ovaries, you may feel some pain in your belly for a few days. Your belly may also be swollen. You may have a change in your bowel movements for a few days. It's normal to also have some shoulder or back pain. This is caused by the gas your doctor put in your belly to help see your organs better. To help with pain, your doctor will prescribe medicines. You may need about 1 week to fully recover. It's important not to lift anything heavy for about 1 week. You can ask your doctor when it's okay to have sex. This care sheet gives you a general idea about how long it will take for you to recover. But each person recovers at a different pace. Follow the steps below to get better as quickly as possible. How can you care for yourself at home? Activity    · Rest when you feel tired.     · Be active. Walking is a good choice.     · Allow your body to heal. Don't move quickly or lift anything heavy until you are feeling better.     · Hold a pillow over your incisions when you cough or take deep breaths. This will support your belly and may help to decrease your pain.     · Do breathing exercises at home as instructed by your doctor. This will help prevent pneumonia. Diet    · You can eat your normal diet.  If your stomach is upset, try bland, low-fat foods like plain rice, broiled chicken, toast, and yogurt.     · Drink plenty of fluids (unless your doctor tells you not to).   · If your bowel movements are not regular right after surgery, try to avoid constipation and straining. Drink plenty of water. Your doctor may suggest fiber, a stool softener, or a mild laxative. Medicines    · Your doctor will tell you if and when you can restart your medicines. He or she will also give you instructions about taking any new medicines.     · If you take blood thinners, such as warfarin (Coumadin), clopidogrel (Plavix), or aspirin, be sure to talk to your doctor. He or she will tell you if and when to start taking those medicines again. Make sure that you understand exactly what your doctor wants you to do.     · Be safe with medicines. Read and follow all instructions on the label. ¨ If the doctor gave you a prescription medicine for pain, take it as prescribed. ¨ If you are not taking a prescription pain medicine, ask your doctor if you can take an over-the-counter medicine. Incision care    · If you have strips of tape on the cut (incision) the doctor made, leave the tape on for a week or until it falls off.     · Wash the area daily with warm, soapy water, and pat it dry. Don't use hydrogen peroxide or alcohol. They can slow healing.     · You may cover the area with a gauze bandage if it oozes fluid or rubs against clothing.     · Change the bandage every day.     · Keep the area clean and dry. Other instructions    · Wear loose, comfortable clothing. For a few weeks, avoid anything that puts pressure on your belly.     · You may want to use a heating pad on your belly to help with pain. Follow-up care is a key part of your treatment and safety. Be sure to make and go to all appointments, and call your doctor if you are having problems. It's also a good idea to know your test results and keep a list of the medicines you take. When should you call for help? Call 911 anytime you think you may need emergency care.  For example, call if:    · You passed out (lost consciousness).     · You have chest pain, are short of breath, or cough up blood.    Call your doctor now or seek immediate medical care if:    · You have pain that does not get better after you take pain medicine.     · You cannot pass stools or gas.     · You have vaginal discharge that has increased in amount or smells bad.     · You are sick to your stomach or cannot drink fluids.     · You have loose stitches, or your incision comes open.     · Bright red blood has soaked through the bandage over your incision.     · You have signs of infection, such as:  ¨ Increased pain, swelling, warmth, or redness. ¨ Red streaks leading from the incision. ¨ Pus draining from the incision. ¨ A fever.     · You have bright red vaginal bleeding that soaks one or more pads in an hour, or you have large clots.     · You have signs of a blood clot in your leg (called a deep vein thrombosis), such as:  ¨ Pain in your calf, back of the knee, thigh, or groin. ¨ Redness and swelling in your leg.    Watch closely for changes in your health, and be sure to contact your doctor if you have any problems. Where can you learn more? Go to http://sujit-colleen.info/. Enter K004 in the search box to learn more about \"Laparoscopic Oophorectomy: What to Expect at Home. \"  Current as of: May 15, 2018  Content Version: 11.8  © 4737-5558 [employer]. Care instructions adapted under license by [employer] (which disclaims liability or warranty for this information). If you have questions about a medical condition or this instruction, always ask your healthcare professional. [place] [address] any warranty or liability for your use of this information.

## 2018-10-08 NOTE — PROGRESS NOTES
Vista Duverney, a 59 y.o. female,  is here for No chief complaint on file. Visit Vitals    /74 (BP 1 Location: Right arm, BP Patient Position: Sitting)    Pulse 65    Temp 96.8 °F (36 °C) (Oral)    Resp 18    Ht 5' 7\" (1.702 m)    Wt 77.5 kg (170 lb 12.8 oz)    SpO2 100%    BMI 26.75 kg/m2     Patient denies any persistent or worsening abdominal or pelvic pain. Denies any unusual vaginal bleeding, discharge, irritation, or odor. Denies experiencing any constipation or diarrhea. No burning, discomfort, or irritation with urination. pateint states she feels pullnig when stretching to pull up into husbands truck, or to reach objects. 1. Have you been to the ER, urgent care clinic since your last visit? Hospitalized since your last visit? No    2. Have you seen or consulted any other health care providers outside of the [address] [place] [address] since your last visit? Include any pap smears or colon screening.  Yes When: 09/2018 Where[facility] Reason for visit: thoracotmy possible lobectomy pre-op

## 2018-10-08 NOTE — PROGRESS NOTES
[facility]  [address], [address], [address] [id number] [address], [address] [place] [address] [phone]  [facility] [place]        Postoperative Office Note  Patient ID:  Name: Cat Borges  MRM: [id number]  : 1960/58 y.o. Date: 10/8/2018    SUBJECTIVE:    This is a 59 y.o.  female who presents s/p Exploratory laparotomy, resection of abdominopelvic mass, bilateral salpingo-oophorectomy on 9/10/18. Currently she has no problems with eating, bowel movements, voiding, or her wound. Appetite is good. Eating a regular diet without difficulty. Urinating without difficulty. Bowel movements are regular. The patient is not having any pain. Her pathology revealed   A: PELVIC MASS, RESECTION:   LEIOMYOMAS ([address] GRAMS). B: LEFT OVARY AND FALLOPIAN TUBE:   BENIGN ATROPHIC OVARY AND BENIGN FALLOPIAN TUBE. Medications:     Current Outpatient Prescriptions on File Prior to Visit   Medication Sig Dispense Refill    HYDROmorphone (DILAUDID) 2 mg tablet Take 1-2 Tabs by mouth every four (4) hours as needed. Max Daily Amount: 24 mg. 60 Tab 0    ALPRAZolam (XANAX) 2 mg tablet 1 tablet by mouth every 8 hours as needed for severe anxiety      atorvastatin (LIPITOR) 20 mg tablet TAKE 1 TABLET BY MOUTH bedtime      clobetasol (TEMOVATE) 0.05 % ointment Apply  to affected area.  hydrOXYzine HCl (ATARAX) 10 mg tablet Take 10 mg by mouth. Indications: psoriasis      metFORMIN (GLUCOPHAGE) 500 mg tablet Take 500 mg by mouth nightly. Indications: type 2 diabetes mellitus      B infantis/B ani/B cristine/B bifid (PROBIOTIC 4X PO) Take 1 Tab by mouth.  traZODone (DESYREL) 150 mg tablet 1 po qhs      urea (CARMOL) 40 % topical cream NAY MARBLE SIZED AMOUNT TO THICKENED AREAS ON BODY ONCE TO BID PRN      diclofenac EC (VOLTAREN) 75 mg EC tablet Take  by mouth two (2) times daily as needed.       etanercept (ENBREL) 50 mg/mL (0.98 mL) injection by SubCUTAneous route Every Thursday. No current facility-administered medications on file prior to visit. Allergies: Allergies   Allergen Reactions    Bee Venom Protein (Honey Bee) Anaphylaxis    Lemon Hives     fresh    Vicodin [Hydrocodone-Acetaminophen] Hives, Itching and Swelling       OBJECTIVE:    Vitals:   Visit Vitals    LMP  (LMP Unknown)       Physical Examination:    General:  alert, cooperative, no distress   Abdomen: soft, bowel sounds active, non-tender   Incision: healing well   Pelvic: deferred   Rectal: not done   Extremity:   extremities normal, atraumatic, no cyanosis or edema     IMPRESSION/PLAN:    Scot Dumont is doing well postoperatively. She has a working diagnosis of benign leiomyoma. The operative procedures and clinical results have been reviewed with the patient. Patient may resume pre-op level of activity as tolerated. Patient encouraged to follow up with PCP or primary gyn for annual WWE. I will see the patient back prn. The patient is advised to call our office with any problems or concerns.     Amy Villalba [facility]  Gynecologic Oncology  10/8/2018/9:58 AM

## 2021-03-03 NOTE — PROGRESS NOTES
Received request via: Pharmacy    Was the patient seen in the last year in this department? Yes    Does the patient have an active prescription (recently filled or refills available) for medication(s) requested? No   Shift Summary:  Patient with midline dressing to abdomen intact with shadow drainage to lower area of dressing. Patient with active bowel sounds and states has been burping. PCA pump has controlled pain level to tolerable level. Patient educated on guaman catheter removal and importance of monitoring ability to void post removal.  Patient agreed and catheter removed. Patient with additional 500 ml of clear, yellow urine plus additional 1250 ml overnight emptied from bag. Ambulated in hallway x 1, performed IS to 1500 -2000 ml, and CHG wipes completed. Patient will advance diet to diabetic this a.m. Denied nausea or vomitting overnight.

## 2024-01-01 NOTE — TELEPHONE ENCOUNTER
Contacted patient to let her know results were received and routed to Dr. lAana Angel for his review. She is very anxious about her scan and lab results and is requesting that Dr. Alana Angel call her with those results as soon as possible. She verbalized understanding that Dr. Alana Angel is in surgery today so the call back would depend upon his surgery schedule. Please call her at [phone] with the results. 2024

## (undated) DEVICE — BSHR MAJOR BASIN PACK-LF: Brand: MEDLINE INDUSTRIES, INC.

## (undated) DEVICE — NEEDLE HYPO 21GA L1.5IN INTRAMUSCULAR S STL LATCH BVL UP

## (undated) DEVICE — SYR 10ML LUER LOK 1/5ML GRAD --

## (undated) DEVICE — INTENDED FOR TISSUE SEPARATION, AND OTHER PROCEDURES THAT REQUIRE A SHARP SURGICAL BLADE TO PUNCTURE OR CUT.: Brand: BARD-PARKER ® CARBON RIB-BACK BLADES

## (undated) DEVICE — SOLUTION IRRIGATION H2O 0797305] ICU MEDICAL INC]

## (undated) DEVICE — STERILE POLYISOPRENE POWDER-FREE SURGICAL GLOVES WITH EMOLLIENT COATING: Brand: PROTEXIS

## (undated) DEVICE — SUT VCRL + 2-0 36IN CT1 UD --

## (undated) DEVICE — INTENDED FOR TISSUE SEPARATION, AND OTHER PROCEDURES THAT REQUIRE A SHARP SURGICAL BLADE TO PUNCTURE OR CUT.: Brand: BARD-PARKER SAFETY BLADES SIZE 15, STERILE

## (undated) DEVICE — DEVON™ KNEE AND BODY STRAP 60" X 3" (1.5 M X 7.6 CM): Brand: DEVON

## (undated) DEVICE — SUTURE VCRL + SZ 0 L18IN ABSRB UD L36MM CT-1 1/2 CIR VCP840D

## (undated) DEVICE — SHEET,DRAPE,40X58,STERILE: Brand: MEDLINE

## (undated) DEVICE — HOOK RETRCT L5MM E SHRP SELF RET SYS LONE STAR

## (undated) DEVICE — CATHETERIZATION TRAY 16 FR FOL DRAINAGE BG LUBRI-SIL IC

## (undated) DEVICE — X-RAY SPONGES,12 PLY: Brand: DERMACEA

## (undated) DEVICE — (D)SYR 10ML 1/5ML GRAD NSAF -- PKGING CHANGE USE ITEM 338027

## (undated) DEVICE — CURVED, LARGE JAW, OPEN SEALER/DIVIDER NANO-COATED: Brand: LIGASURE IMPACT

## (undated) DEVICE — KENDALL SCD EXPRESS SLEEVES, KNEE LENGTH, MEDIUM: Brand: KENDALL SCD

## (undated) DEVICE — SUT VCRL + 0 36IN CT1 UD --

## (undated) DEVICE — MEDI-VAC YANK SUCT HNDL W/TPRD BULBOUS TIP: Brand: CARDINAL HEALTH

## (undated) DEVICE — RING RETRCTR FIG 8 32.5X18.3CM -- PLAS STRL W/2 CATH CLIPS

## (undated) DEVICE — SOL IRRIGATION INJ NACL 0.9% 500ML BTL

## (undated) DEVICE — SUT VCRL + 2-0 27IN CT2 UD -- 36/BX

## (undated) DEVICE — DRSG FOAM MEPILX PST OP 4X12IN --

## (undated) DEVICE — TRAY PREP DRY W/ PREM GLV 2 APPL 6 SPNG 2 UNDPD 1 OVERWRAP

## (undated) DEVICE — PACKING GZ W2INXL6FT WVN COT VAG RADPQ

## (undated) DEVICE — SPONGE: LAP 18X18 PW 200/CS: Brand: NOVAPLUS®

## (undated) DEVICE — TRAY CATH 16FR DRN BG LF -- CONVERT TO ITEM 363158

## (undated) DEVICE — CYSTO/BLADDER IRRIGATION SET, REGULATING CLAMP

## (undated) DEVICE — STERILE POLYISOPRENE POWDER-FREE SURGICAL GLOVES: Brand: PROTEXIS

## (undated) DEVICE — LAP CHOLE: Brand: MEDLINE INDUSTRIES, INC.

## (undated) DEVICE — BASIC SINGLE BASIN 1-LF: Brand: MEDLINE INDUSTRIES, INC.

## (undated) DEVICE — DRAPE TWL SURG 16X26IN BLU ORB04] ALLCARE INC]

## (undated) DEVICE — 4-PORT MANIFOLD: Brand: NEPTUNE 2

## (undated) DEVICE — PACK,LITHOTOMY,PK IV,AURORA: Brand: MEDLINE

## (undated) DEVICE — VESSEL LOOPS,MAXI, RED: Brand: DEVON

## (undated) DEVICE — LEGGINGS, PAIR, 31X48, STERILE: Brand: MEDLINE

## (undated) DEVICE — Device

## (undated) DEVICE — Z INACTIVE USE 2527070 DRAPE SURG W40XL44IN UNDERBUTTOCK SMS POLYPR W/ PCH BK DISP

## (undated) DEVICE — SEALER TISS L20CM DIA13MM ADV BPLR L CRV JAW OPN APPRCH

## (undated) DEVICE — SPONGE LAP 18X18IN STRL -- 5/PK

## (undated) DEVICE — FLOSEAL HEMOSTATIC MATRIX, 5 ML: Brand: FLOSEAL

## (undated) DEVICE — BLADE ELECTRODE: Brand: EDGE

## (undated) DEVICE — NEEDLE HYPO 25GA L1.5IN BLU POLYPR HUB S STL REG BVL STR

## (undated) DEVICE — GOWN,AURORA,NONRNF,XL,30/CS: Brand: MEDLINE

## (undated) DEVICE — REM POLYHESIVE ADULT PATIENT RETURN ELECTRODE: Brand: VALLEYLAB

## (undated) DEVICE — 3L THIN WALL CAN: Brand: CRD

## (undated) DEVICE — SUT MCRYL + 3-0 27IN PS1 UD --